# Patient Record
Sex: MALE | Race: AMERICAN INDIAN OR ALASKA NATIVE | NOT HISPANIC OR LATINO | Employment: UNEMPLOYED | URBAN - METROPOLITAN AREA
[De-identification: names, ages, dates, MRNs, and addresses within clinical notes are randomized per-mention and may not be internally consistent; named-entity substitution may affect disease eponyms.]

---

## 2019-09-04 ENCOUNTER — TRANSFERRED RECORDS (OUTPATIENT)
Dept: HEALTH INFORMATION MANAGEMENT | Facility: CLINIC | Age: 39
End: 2019-09-04

## 2019-12-25 ENCOUNTER — TRANSFERRED RECORDS (OUTPATIENT)
Dept: HEALTH INFORMATION MANAGEMENT | Facility: CLINIC | Age: 39
End: 2019-12-25

## 2019-12-26 LAB
CHOLESTEROL (EXTERNAL): 184 MG/DL
HDLC SERPL-MCNC: 44 MG/DL
LDL CHOLESTEROL CALCULATED (EXTERNAL): 107 MG/DL
NON HDL CHOLESTEROL (EXTERNAL): 140 MG/DL
TRIGLYCERIDES (EXTERNAL): 163 MG/DL

## 2021-02-09 ENCOUNTER — TRANSFERRED RECORDS (OUTPATIENT)
Dept: HEALTH INFORMATION MANAGEMENT | Facility: CLINIC | Age: 41
End: 2021-02-09

## 2021-02-17 ENCOUNTER — TRANSFERRED RECORDS (OUTPATIENT)
Dept: HEALTH INFORMATION MANAGEMENT | Facility: CLINIC | Age: 41
End: 2021-02-17

## 2023-07-13 ENCOUNTER — OFFICE VISIT (OUTPATIENT)
Dept: FAMILY MEDICINE | Facility: CLINIC | Age: 43
End: 2023-07-13
Payer: COMMERCIAL

## 2023-07-13 VITALS
OXYGEN SATURATION: 97 % | RESPIRATION RATE: 16 BRPM | TEMPERATURE: 97.8 F | SYSTOLIC BLOOD PRESSURE: 127 MMHG | HEART RATE: 67 BPM | DIASTOLIC BLOOD PRESSURE: 87 MMHG | WEIGHT: 290.9 LBS

## 2023-07-13 DIAGNOSIS — R07.9 CHEST PAIN, UNSPECIFIED TYPE: Primary | ICD-10-CM

## 2023-07-13 DIAGNOSIS — Z85.528 H/O MALIGNANT NEOPLASM OF KIDNEY: ICD-10-CM

## 2023-07-13 DIAGNOSIS — F41.9 ANXIETY: ICD-10-CM

## 2023-07-13 LAB
ANION GAP SERPL CALCULATED.3IONS-SCNC: 8 MMOL/L (ref 7–15)
ATRIAL RATE - MUSE: 60 BPM
BUN SERPL-MCNC: 22.1 MG/DL (ref 6–20)
CALCIUM SERPL-MCNC: 9.5 MG/DL (ref 8.6–10)
CHLORIDE SERPL-SCNC: 102 MMOL/L (ref 98–107)
CREAT SERPL-MCNC: 1.15 MG/DL (ref 0.67–1.17)
DEPRECATED HCO3 PLAS-SCNC: 28 MMOL/L (ref 22–29)
DIASTOLIC BLOOD PRESSURE - MUSE: NORMAL MMHG
GFR SERPL CREATININE-BSD FRML MDRD: 81 ML/MIN/1.73M2
GLUCOSE SERPL-MCNC: 89 MG/DL (ref 70–99)
INTERPRETATION ECG - MUSE: NORMAL
P AXIS - MUSE: 21 DEGREES
POTASSIUM SERPL-SCNC: 5 MMOL/L (ref 3.4–5.3)
PR INTERVAL - MUSE: 154 MS
QRS DURATION - MUSE: 96 MS
QT - MUSE: 406 MS
QTC - MUSE: 406 MS
R AXIS - MUSE: -8 DEGREES
SODIUM SERPL-SCNC: 138 MMOL/L (ref 136–145)
SYSTOLIC BLOOD PRESSURE - MUSE: NORMAL MMHG
T AXIS - MUSE: 33 DEGREES
TROPONIN T SERPL HS-MCNC: 13 NG/L
VENTRICULAR RATE- MUSE: 60 BPM

## 2023-07-13 PROCEDURE — 84484 ASSAY OF TROPONIN QUANT: CPT | Performed by: NURSE PRACTITIONER

## 2023-07-13 PROCEDURE — 93010 ELECTROCARDIOGRAM REPORT: CPT | Performed by: INTERNAL MEDICINE

## 2023-07-13 PROCEDURE — 80048 BASIC METABOLIC PNL TOTAL CA: CPT | Performed by: NURSE PRACTITIONER

## 2023-07-13 PROCEDURE — 93005 ELECTROCARDIOGRAM TRACING: CPT | Performed by: NURSE PRACTITIONER

## 2023-07-13 PROCEDURE — 99204 OFFICE O/P NEW MOD 45 MIN: CPT | Performed by: NURSE PRACTITIONER

## 2023-07-13 PROCEDURE — 36415 COLL VENOUS BLD VENIPUNCTURE: CPT | Performed by: NURSE PRACTITIONER

## 2023-07-13 RX ORDER — HYDROXYZINE HYDROCHLORIDE 25 MG/1
25 TABLET, FILM COATED ORAL 3 TIMES DAILY PRN
Qty: 20 TABLET | Refills: 0 | Status: SHIPPED | OUTPATIENT
Start: 2023-07-13 | End: 2024-01-11

## 2023-07-13 ASSESSMENT — ENCOUNTER SYMPTOMS
FEVER: 0
SHORTNESS OF BREATH: 0

## 2023-07-13 NOTE — PROGRESS NOTES
"Assessment & Plan     Chest pain, unspecified type    - EKG 12-lead, tracing only  - hydrOXYzine (ATARAX) 25 MG tablet  Dispense: 20 tablet; Refill: 0  - Troponin T, High Sensitivity    H/O malignant neoplasm of kidney    - Basic metabolic panel  (Ca, Cl, CO2, Creat, Gluc, K, Na, BUN)    Anxiety    - hydrOXYzine (ATARAX) 25 MG tablet  Dispense: 20 tablet; Refill: 0     Patient with no known history of cardiac disease or lung disease with intermittent, very brief episodes of chest discomfort lasting \"seconds.\"  Can reproduce it with turning side to side somewhat.  Some brief lightheadedness with going from sitting to standing and one episode while sitting for just a few seconds as well.    Reports increased stress and anxiety.  Has not on any medications.  Is in programming at a sober house 8 hours a day related to addiction issues.    Pain is nearly gone with normal EKG.     Does not have a PCP here.    H/O of renal cancer with nephrectomy.  No baseline labs on file.  Discharged with above labs pending.    Discussed most likely not cardiac cause based on description of issue.      Advised the following    If your chest pain is more persistent (several minutes), very severe, or associated with shortness of breath, prolonged dizziness, sweats, please go to the emergency room.    You can try hydroxyzine which is nonaddictive as needed for anxiety.  This also helps with nausea.  This can make you sleepy.     Please try to excuse him if necessary from programming today if he is very tired from this medication.  Otherwise, try if possible to wait until programming is over to take unless symptoms are severe.      Tylenol 1000 mg 3 times daily as needed for back pain.  Avoid ibuprofen due to the kidney cancer history.    I am going to check your kidneys and a blood test for your heart.  If your kidney function is decreased, I will call to have your dosage of your hydroxyzine reduced.      Establish care with a primary care " "provider.  Please be rechecked next week if possible.              Return in about 1 week (around 7/20/2023).    Maty Laguerre CNP  [facility] RAJAT Negrete is a 42 year old male who presents to clinic today for the following health issues:  Chief Complaint   Patient presents with     Chest Pain     X 1 day, yesterday said he felt cramps in his back, pain progressed through the day, chest pains developed, tightness, occasional stabbing pains, is triggered when he moves or turns a certain way, SOB, no cough or fever.      HPI    Patient who is new to our system with h/o obesity with chest discomfort that comes and goes.  A few seconds.  Feels like 1/10 now.  Feeling crampy.  \"Doesn't feel like heartburn.\" Worse with moving a certain way.  Feeling some back pain between shoulder blades with movement as well.       Assoc with feeling faint while walking yesterday after standing up too fast for a couple seconds.  Also having back pain between shoulder blades starting yesterday.      Increased stress.  Patient moved here 2 months ago for treatment for alcoholism.  Last drink 4 to 5 days ago.  Is in a sober housing.  Has had multiple life events as a result of his alcoholism including loss of his 's license and court cases which are stressful.  Feels frustrated that he cannot remedy the situation quickly.    No lung/heart history.       H/o kidney cancer with nephrectomy 2019.  Was in .  Last f/u 2021.          Review of Systems   Constitutional: Negative for fever.   HENT: Negative for congestion.    Respiratory: Negative for shortness of breath.            Objective    /87 (BP Location: Right arm, Patient Position: Sitting, Cuff Size: Adult Large)   Pulse 67   Temp 97.8  F (36.6  C) (Oral)   Resp 16   Wt 132 kg (290 lb 14.4 oz)   SpO2 97%   Physical Exam  Constitutional:       Appearance: Normal appearance.   Cardiovascular:      Rate and Rhythm: Normal " rate and regular rhythm.      Pulses: Normal pulses.      Heart sounds: Normal heart sounds.   Pulmonary:      Effort: Pulmonary effort is normal.      Breath sounds: Normal breath sounds. No wheezing.   Abdominal:      General: Bowel sounds are normal. There is no distension.      Palpations: Abdomen is soft.      Tenderness: There is no abdominal tenderness. There is no guarding.   Musculoskeletal:         General: Tenderness (Upper mid back, near midline.  ) present.   Neurological:      Mental Status: He is alert.   Psychiatric:         Mood and Affect: Mood normal.         Behavior: Behavior normal.         Thought Content: Thought content normal.         Judgment: Judgment normal.          EKG reviewed by Maty Laguerre CNP:   Normal sinus rhythm.  No acute changes.    Results for orders placed or performed in visit on 07/13/23   EKG 12-lead, tracing only     Status: None (Preliminary result)   Result Value Ref Range    Systolic Blood Pressure  mmHg    Diastolic Blood Pressure  mmHg    Ventricular Rate 60 BPM    Atrial Rate 60 BPM    AZ Interval 154 ms    QRS Duration 96 ms     ms    QTc 406 ms    P Axis 21 degrees    R AXIS -8 degrees    T Axis 33 degrees    Interpretation ECG       Sinus rhythm  Normal ECG  No previous ECGs available

## 2023-07-13 NOTE — PATIENT INSTRUCTIONS
Your heart test, EKG is normal.    If your chest pain is more persistent (several minutes), very severe, or associated with shortness of breath, prolonged dizziness, sweats, please go to the emergency room.    You can try hydroxyzine which is nonaddictive as needed for anxiety.  This also helps with nausea.  This can make you sleepy.     Please try to excuse him if necessary from programming today if he is very tired from this medication.  Otherwise, try if possible to wait until programming is over to take unless symptoms are severe.      Tylenol 1000 mg 3 times daily as needed for back pain.  Avoid ibuprofen due to the kidney cancer history.    I am going to check your kidneys and a blood test for your heart.  If your kidney function is decreased, I will call to have your dosage of your hydroxyzine reduced.      Establish care with a primary care provider.  Please be rechecked next week if possible.

## 2023-07-14 ENCOUNTER — TELEPHONE (OUTPATIENT)
Dept: FAMILY MEDICINE | Facility: CLINIC | Age: 43
End: 2023-07-14
Payer: COMMERCIAL

## 2023-07-14 NOTE — TELEPHONE ENCOUNTER
Called and left VM with result per provider and call back tele phone number if any questions.      Shyam Selby MA on 7/14/2023 at 6:39 PM

## 2023-07-14 NOTE — TELEPHONE ENCOUNTER
Call and left message for patient to return call regarding results. Callback number provided.    Sandi Sumner on 7/14/2023 at 12:47 PM

## 2023-07-14 NOTE — TELEPHONE ENCOUNTER
----- Message from Maty Laguerre CNP sent at 7/13/2023 12:21 PM CDT -----  Please call patient and let him know that his blood work rechecked today was not concerning, including heart test.  You can leave a message as well.    Maty Laguerre CNP

## 2023-07-17 NOTE — PROGRESS NOTES
PRIMARY CARE CENTER         HPI:       Severiano Negrete is a 42 year old male with PMH of RCC s/p Right nephrectomy 2019 (done in [place]) and alcohol use disorder who presents to establish care. Patient recently seen in clinic on 7/13 for anxiety and chest pain, EKG was reassuring, he was started on Hydroxyzine 25 mg TID PRN for anxiety.     Patient has not had medical insurance for quite some time and he would like to address multiple health concerns at his visit patient moved from  2 years ago to [place] but recently moved to [place] about 2 months ago.  Unfortunately has not followed up with urology after his nephrectomy in 2019 for the past 2 to 3 years.  He would like to establish care urology for follow-up.  Patient also has a history of hypertension for which she was previously on lisinopril 5 mg daily, however blood pressure seems to be well controlled ever since he quit smoking 2 months ago.    Patient also has a history of substance use disorder (alcohol and marijuana) for which he received multiple treatments last year.  Patient reports last year was a bad year, had 4-5 relapses.  Right now he lives in a sobriety house and he has been sober for the past 60 days    He is also concerned of multiple small red skin lesions that appeared on his chest and upper back, denies any associated itchiness.  He also reports a wart located on the left thumb.  Finally patient is also complaining of varicose veins on lower extremities.    Patient reports he has a history of depression and anxiety for which she was never treated appropriately diagnosed.  He is interested in discussing this further and would like to set up an appointment    Denies fever, chills, no weight loss, n/v.  Patient denies chest pain, shortness of breath, abdominal pain, diarrhea, changes in vision, hematuria, pain with urination.    Problem, Medication and Allergy Lists were reviewed and are current.  Patient is a new patient to this clinic  and so  I reviewed/updated the Past Medical History, the Family History and the Social History.          Review of Systems:     ROS  I have personally reviewed and updated the complete ROS on the day of the visit.           Physical Exam:   There were no vitals taken for this visit.  There is no height or weight on file to calculate BMI.  Vitals were reviewed     Gen: NAD, alert, cooperative, non-toxic  HEENT: EOMI, no conjunctival icterus, tracking appropriately  Resp: CTAB, no crackles or wheezes, no increased WOB  Cardiac: RRR, no S3/S4, no M/R/G appreciated  GI: obese, soft, non-tender to palpation  Ext: WWP, no edema, no erythema  Skin: several cherry hemangiomas noted on anterior chest and upper back. 1cm wart noted on left thumb  Neuro: AOx3, sensation intact b/l  Psych: appropriate affect, slightly depressed mood          Results:     Results from last visit:  Office Visit on 07/13/2023   Component Date Value Ref Range Status     Ventricular Rate 07/13/2023 60  BPM Final     Atrial Rate 07/13/2023 60  BPM Final     [place] Interval 07/13/2023 154  ms Final     QRS Duration 07/13/2023 96  ms Final     QT 07/13/2023 406  ms Final     QTc 07/13/2023 406  ms Final     P Axis 07/13/2023 21  degrees Final     R AXIS 07/13/2023 -8  degrees Final     T Axis 07/13/2023 33  degrees Final     Interpretation ECG 07/13/2023    Final                    Value:Sinus rhythm  Normal ECG  No previous ECGs available  Confirmed by YUMIKO SPRINGER MD LOC:[facility] ([id number]) on 7/13/2023 4:48:04 PM       Sodium 07/13/2023 138  136 - 145 mmol/L Final     Potassium 07/13/2023 5.0  3.4 - 5.3 mmol/L Final     Chloride 07/13/2023 102  98 - 107 mmol/L Final     Carbon Dioxide (CO2) 07/13/2023 28  22 - 29 mmol/L Final     Anion Gap 07/13/2023 8  7 - 15 mmol/L Final     Urea Nitrogen 07/13/2023 22.1 (H)  6.0 - 20.0 mg/dL Final     Creatinine 07/13/2023 1.15  0.67 - 1.17 mg/dL Final     Calcium 07/13/2023 9.5  8.6 - 10.0 mg/dL Final     Glucose 07/13/2023  89  70 - 99 mg/dL Final     GFR Estimate 07/13/2023 81  >60 mL/min/1.73m2 Final     Troponin T, High Sensitivity 07/13/2023 13  <=22 ng/L Final    Either a High Sensitivity Troponin T baseline (0 hours) value = 100 ng/L, or an increase in High Sensitivity Troponin T = 7 ng/L at 2 hours compared to 0 hours (2-0 hours), suggests myocardial injury, and urgent clinical attention is required.    If the 2-0 hours increase is <7 ng/L, a High Sensitivity Troponin T result above gender-specific reference ranges warrants further evaluation.   Recommendations for further evaluation include correlation with clinical decision-making tool (e.g., HEART), a 3rd High Sensitivity Troponin T test 2 hours after the 2nd (a 20% change from baseline would represent concern), admission for observation, close PCC/cardiology follow-up, or urgent outpatient provocative testing.     Assessment and Plan     There are no diagnoses linked to this encounter.     Hx of RCC s/p nephrectomy (2019)  - Urology referral for follow up    MDD  Patient reports history of depression and anxiety which she never officially diagnosed or treated. Pt recently started on Hydroxyzine 25 mg TID PRN on 7/13/23 at urgent care.   -Appointment scheduled next month for further assessment.  Patient is willing to receive treatment      Substance use disorder  History of marijuana and alcohol use, patient lives in sober living. He reports being sober for 60 days, though urgent care note reports last drink on 7/8/2023  -Patient refused any treatment such as naltrexone at this time    Varicose veins  - compression socks ordered    Hx of Essential HTN  Prior on Lisinopril 5 mg, well controlled after quitting smoking 2 months ago.   - Continue to monitor    Left thumb wart  - Will address at next visit. Encourage to use Compound W wart removal over-the-counter    Preventive care  -Hepatitis B, hepatitis C, HIV screening    Next visit: Address MDD and left thumb wart    Options  for treatment and follow-up care were reviewed with the patient. Severiano Negrete engaged in the decision making process and verbalized understanding of the options discussed and agreed with the final plan.    Jair Dietrich DO, PGY2  Internal Medicine  Jul 20, 2023    Pt was seen and plan of care discussed with Gladis Castillo MD.

## 2023-07-20 ENCOUNTER — LAB (OUTPATIENT)
Dept: LAB | Facility: CLINIC | Age: 43
End: 2023-07-20
Payer: COMMERCIAL

## 2023-07-20 ENCOUNTER — OFFICE VISIT (OUTPATIENT)
Dept: INTERNAL MEDICINE | Facility: CLINIC | Age: 43
End: 2023-07-20
Payer: COMMERCIAL

## 2023-07-20 VITALS
WEIGHT: 292 LBS | SYSTOLIC BLOOD PRESSURE: 134 MMHG | DIASTOLIC BLOOD PRESSURE: 88 MMHG | HEIGHT: 69 IN | OXYGEN SATURATION: 98 % | HEART RATE: 69 BPM | BODY MASS INDEX: 43.25 KG/M2

## 2023-07-20 DIAGNOSIS — Z00.00 ENCOUNTER FOR PREVENTIVE CARE: ICD-10-CM

## 2023-07-20 DIAGNOSIS — F10.21 ALCOHOL DEPENDENCE IN REMISSION (H): ICD-10-CM

## 2023-07-20 DIAGNOSIS — I83.93 ASYMPTOMATIC VARICOSE VEINS OF BOTH LOWER EXTREMITIES: ICD-10-CM

## 2023-07-20 DIAGNOSIS — C64.1 RENAL CELL CARCINOMA OF RIGHT KIDNEY (H): Primary | ICD-10-CM

## 2023-07-20 DIAGNOSIS — F41.9 ANXIETY: ICD-10-CM

## 2023-07-20 DIAGNOSIS — C64.1 RENAL CELL CARCINOMA, RIGHT (H): ICD-10-CM

## 2023-07-20 DIAGNOSIS — B07.9 VIRAL WART ON LEFT THUMB: ICD-10-CM

## 2023-07-20 DIAGNOSIS — F33.9 RECURRENT MAJOR DEPRESSIVE DISORDER, REMISSION STATUS UNSPECIFIED (H): ICD-10-CM

## 2023-07-20 DIAGNOSIS — F19.90 SUBSTANCE USE DISORDER: ICD-10-CM

## 2023-07-20 PROBLEM — F32.A DEPRESSION: Status: ACTIVE | Noted: 2023-07-20

## 2023-07-20 LAB
ALBUMIN SERPL BCG-MCNC: 4.4 G/DL (ref 3.5–5.2)
ALP SERPL-CCNC: 87 U/L (ref 40–129)
ALT SERPL W P-5'-P-CCNC: 8 U/L (ref 0–70)
AST SERPL W P-5'-P-CCNC: 15 U/L (ref 0–45)
BILIRUB DIRECT SERPL-MCNC: <0.2 MG/DL (ref 0–0.3)
BILIRUB SERPL-MCNC: 0.3 MG/DL
PROT SERPL-MCNC: 7.5 G/DL (ref 6.4–8.3)

## 2023-07-20 PROCEDURE — 86704 HEP B CORE ANTIBODY TOTAL: CPT

## 2023-07-20 PROCEDURE — 86803 HEPATITIS C AB TEST: CPT

## 2023-07-20 PROCEDURE — 86706 HEP B SURFACE ANTIBODY: CPT

## 2023-07-20 PROCEDURE — 80076 HEPATIC FUNCTION PANEL: CPT | Performed by: PATHOLOGY

## 2023-07-20 PROCEDURE — 99204 OFFICE O/P NEW MOD 45 MIN: CPT | Mod: GC

## 2023-07-20 PROCEDURE — 36415 COLL VENOUS BLD VENIPUNCTURE: CPT | Performed by: PATHOLOGY

## 2023-07-20 PROCEDURE — 87340 HEPATITIS B SURFACE AG IA: CPT

## 2023-07-20 PROCEDURE — 87389 HIV-1 AG W/HIV-1&-2 AB AG IA: CPT

## 2023-07-20 PROCEDURE — 99000 SPECIMEN HANDLING OFFICE-LAB: CPT | Performed by: PATHOLOGY

## 2023-07-20 NOTE — NURSING NOTE
Severiano Negrete is a 42 year old male patient that presents today in clinic for the following:    Chief Complaint   Patient presents with     Establish Care     Nephrology referral, spots on chest, varicose veins      The patient's allergies and medications were reviewed as noted. A set of vitals were recorded as noted without incident. The patient does not have any other questions for the provider.    Robby Gagnon, EMT at 12:59 PM on 7/20/2023

## 2023-07-21 LAB
HBV CORE AB SERPL QL IA: NONREACTIVE
HBV SURFACE AB SERPL IA-ACNC: 0.42 M[IU]/ML
HBV SURFACE AB SERPL IA-ACNC: NONREACTIVE M[IU]/ML
HBV SURFACE AG SERPL QL IA: NONREACTIVE
HCV AB SERPL QL IA: NONREACTIVE
HIV 1+2 AB+HIV1 P24 AG SERPL QL IA: NONREACTIVE

## 2023-08-13 ENCOUNTER — HEALTH MAINTENANCE LETTER (OUTPATIENT)
Age: 43
End: 2023-08-13

## 2023-08-24 ENCOUNTER — TELEPHONE (OUTPATIENT)
Dept: UROLOGY | Facility: CLINIC | Age: 43
End: 2023-08-24

## 2023-08-24 ENCOUNTER — ANCILLARY PROCEDURE (OUTPATIENT)
Dept: GENERAL RADIOLOGY | Facility: CLINIC | Age: 43
End: 2023-08-24
Attending: INTERNAL MEDICINE
Payer: COMMERCIAL

## 2023-08-24 ENCOUNTER — OFFICE VISIT (OUTPATIENT)
Dept: INTERNAL MEDICINE | Facility: CLINIC | Age: 43
End: 2023-08-24
Payer: COMMERCIAL

## 2023-08-24 ENCOUNTER — TELEPHONE (OUTPATIENT)
Dept: CARE COORDINATION | Facility: CLINIC | Age: 43
End: 2023-08-24

## 2023-08-24 ENCOUNTER — TELEPHONE (OUTPATIENT)
Dept: INTERNAL MEDICINE | Facility: CLINIC | Age: 43
End: 2023-08-24

## 2023-08-24 VITALS
SYSTOLIC BLOOD PRESSURE: 125 MMHG | OXYGEN SATURATION: 95 % | DIASTOLIC BLOOD PRESSURE: 81 MMHG | HEIGHT: 69 IN | HEART RATE: 80 BPM | WEIGHT: 302 LBS | BODY MASS INDEX: 44.73 KG/M2

## 2023-08-24 DIAGNOSIS — R63.4 WEIGHT LOSS: Primary | ICD-10-CM

## 2023-08-24 DIAGNOSIS — S99.921D INJURY OF RIGHT FOOT, SUBSEQUENT ENCOUNTER: ICD-10-CM

## 2023-08-24 DIAGNOSIS — F41.1 GAD (GENERALIZED ANXIETY DISORDER): Primary | ICD-10-CM

## 2023-08-24 PROCEDURE — 73630 X-RAY EXAM OF FOOT: CPT | Mod: RT | Performed by: RADIOLOGY

## 2023-08-24 PROCEDURE — 99214 OFFICE O/P EST MOD 30 MIN: CPT | Mod: GC

## 2023-08-24 ASSESSMENT — PATIENT HEALTH QUESTIONNAIRE - PHQ9
5. POOR APPETITE OR OVEREATING: MORE THAN HALF THE DAYS
SUM OF ALL RESPONSES TO PHQ QUESTIONS 1-9: 8

## 2023-08-24 ASSESSMENT — PAIN SCALES - GENERAL: PAINLEVEL: MILD PAIN (3)

## 2023-08-24 ASSESSMENT — ANXIETY QUESTIONNAIRES
GAD7 TOTAL SCORE: 16
6. BECOMING EASILY ANNOYED OR IRRITABLE: NEARLY EVERY DAY
1. FEELING NERVOUS, ANXIOUS, OR ON EDGE: MORE THAN HALF THE DAYS
5. BEING SO RESTLESS THAT IT IS HARD TO SIT STILL: SEVERAL DAYS
GAD7 TOTAL SCORE: 16
IF YOU CHECKED OFF ANY PROBLEMS ON THIS QUESTIONNAIRE, HOW DIFFICULT HAVE THESE PROBLEMS MADE IT FOR YOU TO DO YOUR WORK, TAKE CARE OF THINGS AT HOME, OR GET ALONG WITH OTHER PEOPLE: VERY DIFFICULT
7. FEELING AFRAID AS IF SOMETHING AWFUL MIGHT HAPPEN: MORE THAN HALF THE DAYS
2. NOT BEING ABLE TO STOP OR CONTROL WORRYING: NEARLY EVERY DAY
3. WORRYING TOO MUCH ABOUT DIFFERENT THINGS: NEARLY EVERY DAY

## 2023-08-24 NOTE — TELEPHONE ENCOUNTER
ISABELM and callback to schedule new pt in uro and per referral with either Dr. Severiano Marcelino or Dr. Samson

## 2023-08-24 NOTE — TELEPHONE ENCOUNTER
[place]- Inpatient Dietitian at the [facility] called and stated they keep getting referrals from some of the resident Mds to see a dietitian inpatient and they do not see those patients- this pt is an example.

## 2023-08-24 NOTE — TELEPHONE ENCOUNTER
In 3 weeks please follow up with patient on-    Imaging  Prozac  Mental [facility] Referral     He also needs to schedule with urology, hx of renal cell carcinoma status post nephrectomy     Rogelio Strauss RN on 8/24/2023 at 1:30 PM

## 2023-08-24 NOTE — PROGRESS NOTES
"                     PRIMARY CARE CENTER       SUBJECTIVE:  Severiano Negrete is a 43 year old male with a PMHx of  RCC s/p Right nephrectomy 2019 (done in [place]) and alcohol use disorder  who comes in for follow up visit. Pt is worried about gaining weight, has gained 12 pounds since last visit in July (290 to 302 lbs).  He thinks this is mostly due to lack of exercise and \" sitting around all day during alcohol addiction therapy groups meetings\".  Patient purchased a new bike and is planning on increasing his exercise.  We had a long conversation regarding diet and exercise, should avoid carbohydrates and fried food, fast food is much as possible.  I advised him to use phone apps such as my fitness pal to track his calorie intake.  He is also willing to discuss further with a nutritionist.    In addition, pt sprained his right ankle 3 weeks ago when stepping out of the bus. He has a hx of right foot fracture many years ago (?20 years), had screws placed at that time, he was told these should have been removed.  Would like to get a foot x-ray to assess screws and follow-up with orthopedic.    Patient states his anxiety has been affecting his day-to-day life and social interactions.  He also has a history of depression however he seems to be more concerned about his anxiety.  He is not sure if he ever used medications in the past for his depression anxiety, though fluoxetine sounds familiar to him.  He is not aware of any side effects to fluoxetine in the past if ever taken.    Urology consult was placed at last visit to follow-up on history of renal cell carcinoma status post nephrectomy from 2019, patient did not schedule appointment yet.  Contact information was supplied to patient and strongly encouraged to schedule follow-up appointment      Medications and allergies reviewed by me today.     ROS:   Constitutional, neuro, ENT, endocrine, pulmonary, cardiac, gastrointestinal, genitourinary, musculoskeletal, integument " "and psychiatric systems are negative, except as otherwise noted.    OBJECTIVE:    /81 (BP Location: Right arm, Patient Position: Sitting, Cuff Size: Adult Large)   Pulse 80   Ht 1.762 m (5' 9.37\")   Wt 137 kg (302 lb)   SpO2 95%   BMI 44.12 kg/m     Wt Readings from Last 1 Encounters:   08/24/23 137 kg (302 lb)     Gen: NAD, alert, cooperative, non-toxic  HEENT: EOMI, no conjunctival icterus, tracking appropriately  Resp: CTAB, no crackles or wheezes, no increased WOB  Cardiac: RRR, no S3/S4, no M/R/G appreciated  GI: obese, soft, non-tender to palpation  Ext: WWP, no edema, no erythema  Skin: several cherry hemangiomas noted on anterior chest and upper back. 1cm in diameter wart noted on left thumb  Neuro: AOx3, sensation intact b/l  Psych: appropriate affect, slightly depressed mood     ASSESSMENT/PLAN:    #Depression  #Anxiety  Pt with known hx of Depression, anxiety unsure if he was ever on medication prior. PHQ9 score 8, GAD7 score 16. Anxiety has been impacting his daily life and social interactions.   - Fluoxetine 20 mg daily, RN will follow up in 3 weeks to assess for side effects, follow up in clinic in 2 months.   - Mental health referral placed    #Left thumb wart  Protuberant wart measuring approx 1x1 cm in size located on lateral aspect of right thumb base. Pt used Compound W with no improvement. Treated with 2 rounds of cryotherapy in office today, no immediate complications noted. Plan to reassess at next visit.   - CTM    #Hx of right foot fracture  #Right foot sprain  Hx of right foot fracture that required placement of several screws, was recommended to have these removed many years ago. Pt also reports recent right foot sprain 3 weeks ago when stepping out of bus, seems to have resolved. Pt able to bear weight, no acute pain, motion/sensation intact  - 3 view r foot xray ordered  - Ortho referral placed    #Obesity  Pt complains of 10 lbs weight gain since last visit 1 month ago. We " discussed exercise regimen and diet, pt seems motivated to lose weight.  - Nutrition service consult placed    #Hx of RCC s/p nephrectomy 2019  Urology referral placed at last visit, phone number provided to patient again. Strongly encouraged patient to schedule appointment as soon as possible for appropriate follow up.     #Hx of HTN  Well controlled off medication, prior on Lisinopril  - CTM    Next visit: Reassess warts, pt would like to discuss scrotal cyst, evaluate depression/anxiety       Pt should return to clinic for f/u with me in 2 months     Jair Dietrich DO, PGY2  Internal Medicine  [facility]   Clinic phone [phone]  Aug 24, 2023    Pt was seen and plan of care discussed with Dr. Ramos.

## 2023-08-24 NOTE — NURSING NOTE
Severiano Negrete is a 43 year old male that presents in clinic today for the following:     Chief Complaint   Patient presents with    Follow Up     Pt here for one month follow up        The patient's allergies and medications were reviewed. The patient's vitals were obtained without incident. The patient does not have any other questions for the provider.     Milady Leonardo, MYLENE at 12:07 PM on 8/24/2023.  Primary Care Clinic: [phone]

## 2023-08-24 NOTE — PATIENT INSTRUCTIONS
Debrox OTC for ear wax.     Our nurse will reach out in 3 weeks to see how the new medication is working for you. Please reach out to us if experiencing suicidal thoughts, worsening depression and anxiety.     Urology  Please call [phone] to schedule an appointment with Urology.     Xray  To schedule your Xray appointment with imaging, please call [phone].

## 2023-09-05 NOTE — TELEPHONE ENCOUNTER
Action 2023 Jtv 11:44 AM    Action Taken CSS called patient. Patient did not . [facility] LVM for patient to return call. Message was sent to Nurse with update.      Action 2023 JTV 10:34 AM    Action Taken CSS sent an urgent request to Erik Frye for images. Fax: [phone]    Trackin     Action 2023 jtv 9:33 AM    Action Taken iMAGES HAVE BEEN RECEIVED.        MEDICAL RECORDS REQUEST   [facility]  Urology Clinic  [address] [place] [address]  PHONE: [phone]  Fax: [phone]        FUTURE VISIT INFORMATION                                                   Severiano Negrete, : 1980 scheduled for future visit at [facility]    APPOINTMENT INFORMATION:  Date: 2023  Provider:  Darrin Chaparro MD  Reason for Visit/Diagnosis: Renal cell carcinoma of right kidney     REFERRAL INFORMATION:  Referring provider:  Jair Dietrich MD in [facility]    RECORDS REQUESTED FOR VISIT                                                     NOTES  STATUS/DETAILS   OFFICE NOTE from referring provider  yes, 2023 -- Jair Dietrich MD in [facility]   OFFICE NOTE from other specialist  yes, 2023 -- Vlad Porter MD @ [facility]   MEDICATION LIST  yes   KIDNEY MASS     CHEST XRAY (CXR)  YES IMAGES PENDING   PATHOLOGY REPORT & SLIDES  no     PRE-VISIT CHECKLIST      Joint diagnostic appointment coordinated correctly          (ensure right order & amount of time) Yes   RECORD COLLECTION COMPLETE YES

## 2023-09-08 NOTE — TELEPHONE ENCOUNTER
Action 09/08/23  3:31 PM - [facility]   Action Taken  Pt had surgery 20+ years ago in [place], [place]    Called the 3 possible hospitals:  - [facility] does not have record of him.  - Left message for [facility]  - [facility] does not have record of him.       DIAGNOSIS: (R) Old Injury of right foot, subsequent encounter, images, Paramjit Ramos    APPOINTMENT DATE: 09/18/23   NOTES STATUS DETAILS   OFFICE NOTE from referring provider Internal 08/24/23 - Jair Dietrich MD    OPERATIVE REPORT N/A Roughly 20+ years ago in [place], [place].   MEDICATION LIST Internal    XRAYS  & INJECTIONS (IMAGES & REPORTS) Internal XR R FOOT:  - 08/24/23

## 2023-09-18 ENCOUNTER — PRE VISIT (OUTPATIENT)
Dept: ORTHOPEDICS | Facility: CLINIC | Age: 43
End: 2023-09-18

## 2023-09-18 ENCOUNTER — OFFICE VISIT (OUTPATIENT)
Dept: ORTHOPEDICS | Facility: CLINIC | Age: 43
End: 2023-09-18
Attending: INTERNAL MEDICINE
Payer: COMMERCIAL

## 2023-09-18 DIAGNOSIS — S99.921D INJURY OF RIGHT FOOT, SUBSEQUENT ENCOUNTER: ICD-10-CM

## 2023-09-18 PROCEDURE — 99204 OFFICE O/P NEW MOD 45 MIN: CPT | Performed by: FAMILY MEDICINE

## 2023-09-18 NOTE — PROGRESS NOTES
[facility]  SPORTS & ORTHOPEDIC CLINIC VISIT     Sep 18, 2023        ASSESSMENT & PLAN    43-year-old with right foot and ankle pain after recent injury in the setting of previous hardware fixation and failure.  At the current time his symptoms seem consistent with ligamentous injury.  I do not suspect the current time his hardware is causing him any degree of symptoms.    Reviewed imaging and assessment with patient in detail  And provided with home exercises and we discussed activity modifications for his ankle sprain.  We also discussed the timing and indication for removal of hardware.  At the current time is not felt that the hardware is a significant tributary to his symptoms and therefore would not advise removal.  However if the experiences discomfort in the future we would refer to a surgeon for further discussion.    Aden Lewis MD  [facility] [place]    -----  Chief Complaint   Patient presents with    Right Foot - Pain       SUBJECTIVE  Severiano Negrete is a/an 43 year old male who is seen in consultation at the request of  Dave Ramos M.D. for evaluation of  right foot injury.     The patient is seen by themselves.  The patient is Right handed    Onset: 1 month(s) ago. Patient describes injury as rolling ankle getting off a bus.   Location of Pain: right foot  Worsened by: Only pain was when he sprained his ankle   Better with: NA   Treatments tried: rest/activity avoidance  Associated symptoms: no distal numbness or tingling; denies swelling or warmth    Orthopedic/Surgical history: YES - Date: 20 years ago? Has 3 screws in   Social History/Occupation: No       REVIEW OF SYSTEMS:  Do you have fever, chills, weight loss? No  Do you have any vision problems? No  Do you have any chest pain or edema? No  Do you have any shortness of breath or wheezing?  No  Do you have stomach problems? No  Do you have any numbness or focal weakness? No  Do you  have diabetes? No   Do you have problems with bleeding or clotting? No  Do you have an rashes or other skin lesions? No    OBJECTIVE:  There were no vitals taken for this visit.     Right foot: Warm and well-perfused.  Sensation intact to light touch.  Symmetric range of motion of the ankle.  Strength intact.  Palpable hardware over the proximal fifth metatarsal though nontender.  No tenderness throughout the midfoot.  TTP over the ATFL and CFL.  No TTP over the tibiotalar joint or medial ankle.    RADIOLOGY:    Three-view x-rays of the right foot performed 8/24/2023 are reviewed independently which demonstrate screws in the first second and fifth metatarsal tarsal junctions.  Fracture of the screw in the fifth metatarsal but otherwise no signs of loosening.  No acute findings.  No significant DJD.  See EMR for full radiology report.

## 2023-09-18 NOTE — PATIENT INSTRUCTIONS
WHAT IS AN ANKLE SPRAIN:  Symptoms include pain, bruising, and swelling around ankle, often on outer side. The pain may be sharp with activity and dull at rest. You may be walking with a limp at first. The swelling is often present after the pain resolves. If your pain is severe, not improving over 5-7 days, or shoots up your leg, let your physician know as you may need crutches and an immobilizer.    Treatment:  -Ice 10-15 minutes several times a day for the first few days and then after activity.  -Walk as tolerated. Restrict other activities until pain allows. Biking, pool running or swimming are good alternative activities.  -You may benefit from an ankle brace for support while strengthening with therapy  -Some require immobilzation and crutches initially    Strengthening therapy  -Ice 10-15 minutes after activity. (or Ice bath 5-7min)  -often hip and ankle weakness leads to lower extremity (foot, ankle, shin, and knee) problems so a lot of focus will be on core strength and balance  - recommend yoga for core strengthening and stretching  -Perform exercises as instructed through handout or formal therapy if doing. Until then start with the following:  -ankle strengthening (additional below)   1) balance on one foot 1-2 min daily (perform on both feet)   2) arch raises- tighten bottom of foot like trying to shorten foot and hold x 3-5  sec, repeat 5 times   3) ankle exercises (4 way with theraband)- 3 sets of 10-15 (fatigue) daily   5) heel raises on step-- once painfree lowering on both, start to slowly lower on  one foot    Return to activity guidelines:  -If it hurts, do not do it!  -wear ankle brace until pain free with full activity and exercises for at least 6 weeks  -Must meet each goal before return to play:   1) painfree jogging straight line   2) pain free sprints   3) pain free cutting/ changing directions      Ankle Sprain Exercises    As soon as it doesn t hurt too much to put pressure on the ball  of your foot, start stretching your ankle using the towel stretch. When this stretch is easy, try the other exercises.    Towel stretch: Sit on a hard surface with your injured leg stretched out in front of you. Loop a towel around your toes and the ball of your foot and pull the towel toward your body keeping your leg straight. Hold this position for 15 to 30 seconds and then relax. Repeat 3 times.    Standing calf stretch: Stand facing a wall with your hands on the wall at about eye level. Keep your injured leg back with your heel on the floor. Keep the other leg forward with the knee bent. Turn your back foot slightly inward (as if you were pigeon-toed). Slowly lean into the wall until you feel a stretch in the back of your calf. Hold the stretch for 15 to 30 seconds. Return to the starting position. Repeat 3 times. Do this exercise several times each day.    Standing soleus stretch: Stand facing a wall with your hands on the wall at about chest height. Keep your injured leg back with your heel on the floor. Keep the other leg forward with the knee bent. Turn your back foot slightly inward (as if you were pigeon-toed). Bend your back knee slightly and gently lean into the wall until you feel a stretch in the lower calf of your injured leg. Hold the stretch for 15 to 30 seconds. Return to the starting position. Repeat 3 times.    Ankle range of motion: Sit or lie down with your legs straight and your knees pointing toward the ceiling. Point your toes on your injured side toward your nose, then away from your body. Point your toes in toward your other foot and then out away from your other foot. Finally, move the top of your foot in circles. Move only your foot and ankle. Don't move your leg. Repeat 10 times in each direction. Push hard in all directions.  Resisted ankle dorsiflexion: Tie a knot in one end of the elastic tubing and shut the knot in a door. Tie a loop in the other end of the tubing and put the foot  on your injured side through the loop so that the tubing goes around the top of the foot. Sit facing the door with your injured leg straight out in front of you. Move away from the door until there is tension in the tubing. Keeping your leg straight, pull the top of your foot toward your body, stretching the tubing. Slowly return to the starting position. Do 2 sets of 15.  Resisted ankle plantar flexion: Sit with your injured leg stretched out in front of you. Loop the tubing around the ball of your foot. Hold the ends of the tubing with both hands. Gently press the ball of your foot down and point your toes, stretching the tubing. Return to the starting position. Do 2 sets of 15.    Resisted ankle inversion: Sit with your legs stretched out in front of you. Cross the ankle of your uninjured leg over your other ankle. Wrap elastic tubing around the ball of the foot of your injured leg and then loop it around your other foot so that the tubing is anchored there at one end. Hold the other end of the tubing in your hand. Turn the foot of your injured leg inward and upward. This will stretch the tubing. Return to the starting position. Do 2 sets of 15.    Resisted ankle eversion: Sit with both legs stretched out in front of you, with your feet about a shoulder's width apart. Tie a loop in one end of elastic tubing. Put the foot of your injured leg through the loop so that the tubing goes around the arch of that foot and wraps around the outside of the other foot. Hold onto the other end of the tubing with your hand to provide tension. Turn the foot of your injured leg up and out. Make sure you keep your other foot still so that it will allow the tubing to stretch as you move the foot of your injured leg. Return to the starting position. Do 2 sets of 15.  You may do the following exercises when you can stand on your injured ankle without pain.    Heel raise: Stand behind a chair or counter with both feet flat on the  floor. Using the chair or counter as a support, rise up onto your toes and hold for 5 seconds. Then slowly lower yourself down without holding onto the support. (It's OK to keep holding onto the support if you need to.) When this exercise becomes less painful, try doing this exercise while you are standing on the injured leg only. Repeat 15 times. Do 2 sets of 15. Rest 30 seconds between sets.    Step-up: Stand with the foot of your injured leg on a support 3 to 5 inches (8 to 13 centimeters) high --like a small step or block of wood. Keep your other foot flat on the floor. Shift your weight onto the injured leg on the support. Straighten your injured leg as the other leg comes off the floor. Return to the starting position by bending your injured leg and slowly lowering your uninjured leg back to the floor. Do 2 sets of 15.    Balance and reach exercises: Stand next to a chair with your injured leg farther from the chair. The chair will provide support if you need it. Stand on the foot of your injured leg and bend your knee slightly. Try to raise the arch of this foot while keeping your big toe on the floor. Keep your foot in this position.    With the hand that is farther away from the chair, reach forward in front of you by bending at the waist. Avoid bending your knee any more as you do this. Repeat this 15 times. To make the exercise more challenging, reach farther in front of you. Do 2 sets of 15.    While keeping your arch raised, reach the hand that is farther away from the chair across your body toward the chair. The farther you reach, the more challenging the exercise. Do 2 sets of 15.    Side-lying leg lift: Lie on your uninjured side. Tighten the front thigh muscles on your injured leg and lift that leg 8 to 10 inches (20 to 25 centimeters) away from the other leg. Keep the leg straight and lower it slowly. Do 2 sets of 15.  If you have access to a wobble board, do the following exercises:    Wobble  board exercises  Stand on a wobble board with your feet shoulder-width apart.    Rock the board forwards and backwards 30 times, then side to side 30 times. Hold on to a chair if you need support.  Rotate the wobble board around so that the edge of the board is in contact with the floor at all times. Do this 30 times in a clockwise and then a counterclockwise direction.  Balance on the wobble board for as long as you can without letting the edges touch the floor. Try to do this for 2 minutes without touching the floor.  Rotate the wobble board in clockwise and counterclockwise circles, but do not let the edge of the board touch the floor.  When you have mastered the wobble exercises standing on both legs, try repeating them while standing on just your injured leg. After you are able to do these exercises on one leg, try to do them with your eyes closed. Make sure you have something nearby to support you in case you lose your balance.    Developed by [employer].  Published by [employer].  Copyright  2014 [employer] and/or one of its subsidiaries. All rights reserved.

## 2023-09-18 NOTE — LETTER
9/18/2023      RE: Severiano Negrete  [address]  [place] [address]     Dear Colleague,    Thank you for referring your patient, Severiano Negrete, to the [facility]. Please see a copy of my visit note below.      [facility]  SPORTS & ORTHOPEDIC CLINIC VISIT     Sep 18, 2023        ASSESSMENT & PLAN    43-year-old with right foot and ankle pain after recent injury in the setting of previous hardware fixation and failure.  At the current time his symptoms seem consistent with ligamentous injury.  I do not suspect the current time his hardware is causing him any degree of symptoms.    Reviewed imaging and assessment with patient in detail  And provided with home exercises and we discussed activity modifications for his ankle sprain.  We also discussed the timing and indication for removal of hardware.  At the current time is not felt that the hardware is a significant tributary to his symptoms and therefore would not advise removal.  However if the experiences discomfort in the future we would refer to a surgeon for further discussion.    Aden Lewis MD  [facility]    -----  Chief Complaint   Patient presents with     Right Foot - Pain       SUBJECTIVE  Severiano Negrete is a/an 43 year old male who is seen in consultation at the request of  Dave Ramos M.D. for evaluation of  right foot injury.     The patient is seen by themselves.  The patient is Right handed    Onset: 1 month(s) ago. Patient describes injury as rolling ankle getting off a bus.   Location of Pain: right foot  Worsened by: Only pain was when he sprained his ankle   Better with: NA   Treatments tried: rest/activity avoidance  Associated symptoms: no distal numbness or tingling; denies swelling or warmth    Orthopedic/Surgical history: YES - Date: 20 years ago? Has 3 screws in   Social History/Occupation: No       REVIEW OF SYSTEMS:  Do you  have fever, chills, weight loss? No  Do you have any vision problems? No  Do you have any chest pain or edema? No  Do you have any shortness of breath or wheezing?  No  Do you have stomach problems? No  Do you have any numbness or focal weakness? No  Do you have diabetes? No   Do you have problems with bleeding or clotting? No  Do you have an rashes or other skin lesions? No    OBJECTIVE:  There were no vitals taken for this visit.     Right foot: Warm and well-perfused.  Sensation intact to light touch.  Symmetric range of motion of the ankle.  Strength intact.  Palpable hardware over the proximal fifth metatarsal though nontender.  No tenderness throughout the midfoot.  TTP over the ATFL and CFL.  No TTP over the tibiotalar joint or medial ankle.    RADIOLOGY:    Three-view x-rays of the right foot performed 8/24/2023 are reviewed independently which demonstrate screws in the first second and fifth metatarsal tarsal junctions.  Fracture of the screw in the fifth metatarsal but otherwise no signs of loosening.  No acute findings.  No significant DJD.  See EMR for full radiology report.             Again, thank you for allowing me to participate in the care of your patient.      Sincerely,    Aden Lewis MD

## 2023-09-19 ENCOUNTER — PRE VISIT (OUTPATIENT)
Dept: ONCOLOGY | Facility: CLINIC | Age: 43
End: 2023-09-19
Payer: COMMERCIAL

## 2023-09-19 ENCOUNTER — PATIENT OUTREACH (OUTPATIENT)
Dept: ONCOLOGY | Facility: CLINIC | Age: 43
End: 2023-09-19

## 2023-09-19 ENCOUNTER — OFFICE VISIT (OUTPATIENT)
Dept: ONCOLOGY | Facility: CLINIC | Age: 43
End: 2023-09-19
Attending: INTERNAL MEDICINE
Payer: COMMERCIAL

## 2023-09-19 VITALS
HEIGHT: 70 IN | HEART RATE: 61 BPM | SYSTOLIC BLOOD PRESSURE: 137 MMHG | BODY MASS INDEX: 43.84 KG/M2 | OXYGEN SATURATION: 98 % | RESPIRATION RATE: 18 BRPM | WEIGHT: 306.2 LBS | DIASTOLIC BLOOD PRESSURE: 84 MMHG

## 2023-09-19 DIAGNOSIS — C64.1 RENAL CELL CARCINOMA OF RIGHT KIDNEY (H): Primary | ICD-10-CM

## 2023-09-19 PROCEDURE — G0463 HOSPITAL OUTPT CLINIC VISIT: HCPCS | Performed by: STUDENT IN AN ORGANIZED HEALTH CARE EDUCATION/TRAINING PROGRAM

## 2023-09-19 PROCEDURE — 99203 OFFICE O/P NEW LOW 30 MIN: CPT | Performed by: STUDENT IN AN ORGANIZED HEALTH CARE EDUCATION/TRAINING PROGRAM

## 2023-09-19 ASSESSMENT — PAIN SCALES - GENERAL: PAINLEVEL: NO PAIN (0)

## 2023-09-19 NOTE — PATIENT INSTRUCTIONS
Follow-up yearly   CT abdomen and Pelvis to be done and Chest Xray.  We will update results on my chart  Please collect old records

## 2023-09-19 NOTE — PROGRESS NOTES
Chief Complaint:   History of right radical nephrectomy for kidney cancer in 2019           Consult or Referral:     Mr. Severiano Negrete is a 43 year old male seen at the request of  No ref. provider found.         History of Present Illness:     Severiano Nergete is a 43 year old male being seen for establishing care for RCC.  Duration of problem: 4 years  Previous treatments: Right radical nephrectomy in 2019      Reviewed previous notes from Dr. Kaur العلي presents today here for evaluation and establishing care  He has a prior history of right radical nephrectomy with robotic approach in 2019 in [place]  We do not have any details of the pathology although his records from surgery  He has not had any surveillance imaging done since  He is here to establish follow-up with us           Past Medical History:   No past medical history on file.         Past Surgical History:   No past surgical history on file.         Medications     Current Outpatient Medications   Medication    FLUoxetine (PROZAC) 20 MG capsule    hydrOXYzine (ATARAX) 25 MG tablet     No current facility-administered medications for this visit.            Family History:   No family history on file.         Social History:     Social History     Socioeconomic History    Marital status: Single     Spouse name: Not on file    Number of children: Not on file    Years of education: Not on file    Highest education level: Not on file   Occupational History    Not on file   Tobacco Use    Smoking status: Former     Packs/day: 1.00     Types: Cigarettes     Start date: 1993     Quit date: 2023     Years since quittin.3    Smokeless tobacco: Never   Substance and Sexual Activity    Alcohol use: Not Currently    Drug use: Not Currently    Sexual activity: Not on file   Other Topics Concern    Not on file   Social History Narrative    Not on file     Social Determinants of Health     Financial Resource Strain: Not on file   Food Insecurity: Not  "on file   Transportation Needs: Not on file   Physical Activity: Not on file   Stress: Not on file   Social Connections: Not on file   Intimate Partner Violence: Not on file   Housing Stability: Not on file            Allergies:   Patient has no known allergies.         Review of Systems:  From intake questionnaire     Skin: negative  Eyes: negative  Ears/Nose/Throat: negative  Respiratory: No shortness of breath, dyspnea on exertion, cough, or hemoptysis  Cardiovascular: No chest pain or palpitations  Gastrointestinal: negative; no nausea/vomiting, constipation or diarrhea  Genitourinary: as per HPI  Musculoskeletal: negative  Neurologic: negative  Psychiatric: negative  Hematologic/Lymphatic/Immunologic: negative  Endocrine: negative         Physical Exam:     Patient is a 43 year old  male   Vitals: Blood pressure 137/84, pulse 61, resp. rate 18, height 1.778 m (5' 10\"), weight 138.9 kg (306 lb 3.2 oz), SpO2 98 %.  Constitutional: Body mass index is 43.94 kg/m .  Alert, no acute distress, oriented, conversant  Eyes: no scleral icterus; extraocular muscles intact, moist conjunctivae  Neck: trachea midline, no thyromegaly  Ears/nose/mouth: throat/mouth:normal, good dentition  Respiratory: no respiratory distress, or pursed lip breathing  Cardiovascular: pulses strong and intact; no obvious jugular venous distension present  Gastrointestinal: soft, nontender, no organomegaly or masses,   Lymphatics: No inguinal adenopathy  Musculoskeletal: extremities normal, no peripheral edema  Skin: no suspicious lesions or rashes  Neuro: Alert, oriented, speech and mentation normal  Psych: affect and mood normal, alert and oriented to person, place and time  Gait: Normal  : deferred      Labs and Pathology:    The following labs were reviewed by me and discussed with the patient:    Significant for   Lab Results   Component Value Date    CR 1.15 07/13/2023     No results found for: PSA          Imaging:    The following " imaging exams were independently viewed and interpreted by me and discussed with patient:               Assessment and Plan:     Renal cell carcinoma of right kidney (H)  Here to establish care for prior history of RCC proximately 4 years ago  Recommend baseline imaging and have ordered CT abdomen and x-ray chest  We will collect his records from [facility] done as none are available prior to 2021  Plan to continue yearly follow-up till 5 years and further follow-up will be based on the primary pathology and stage of disease at the time of surgery  He agrees  - Adult Urology  Referral  - CT Abdomen pelvis w & w/o contrast; Future  - XR Chest 2 Views; Future      Plan:  CT abdomen pelvis with and without contrast and x-ray chest    Orders  Orders Placed This Encounter   Procedures    CT Abdomen pelvis w & w/o contrast    XR Chest 2 Views       Darrin Chaparro MD  [facility]      ==========================    Additional Billing and Coding Information:  Review of external notes as documented above   Review of the result(s) of each unique test - creatinine                20 minutes spent by me on the date of the encounter doing chart review, review of test results, interpretation of tests, patient visit, and documentation     ==========================

## 2023-09-19 NOTE — PROGRESS NOTES
"Urology Rooming Note    September 19, 2023 10:07 AM   Severiano Negrete is a 43 year old male who presents for:    Chief Complaint   Patient presents with    Oncology Clinic Visit     Renal cell carcinoma, right (H)       Initial Vitals: /84   Pulse 61   Resp 18   Ht 1.778 m (5' 10\")   Wt 138.9 kg (306 lb 3.2 oz)   SpO2 98%   BMI 43.94 kg/m   Estimated body mass index is 43.94 kg/m  as calculated from the following:    Height as of this encounter: 1.778 m (5' 10\").    Weight as of this encounter: 138.9 kg (306 lb 3.2 oz). Body surface area is 2.62 meters squared.  No Pain (0) Comment: Data Unavailable     Allergies reviewed: Yes  Medications reviewed: Yes    Medications: Medication refills not needed today.  Pharmacy name entered into [facility]: [id number] - [place], [place] - [address] AT SEC OF [address]   "

## 2023-09-26 ENCOUNTER — HOSPITAL ENCOUNTER (OUTPATIENT)
Dept: CT IMAGING | Facility: CLINIC | Age: 43
Discharge: HOME | End: 2023-09-26
Attending: STUDENT IN AN ORGANIZED HEALTH CARE EDUCATION/TRAINING PROGRAM
Payer: COMMERCIAL

## 2023-09-26 ENCOUNTER — HOSPITAL ENCOUNTER (OUTPATIENT)
Dept: RADIOLOGY | Facility: CLINIC | Age: 43
Discharge: HOME | End: 2023-09-26
Attending: STUDENT IN AN ORGANIZED HEALTH CARE EDUCATION/TRAINING PROGRAM
Payer: COMMERCIAL

## 2023-09-26 DIAGNOSIS — C64.1 RENAL CELL CARCINOMA OF RIGHT KIDNEY (H): ICD-10-CM

## 2023-09-26 PROCEDURE — 250N000011 HC RX IP 250 OP 636: Mod: JZ | Performed by: STUDENT IN AN ORGANIZED HEALTH CARE EDUCATION/TRAINING PROGRAM

## 2023-09-26 PROCEDURE — 71046 X-RAY EXAM CHEST 2 VIEWS: CPT

## 2023-09-26 PROCEDURE — 74178 CT ABD&PLV WO CNTR FLWD CNTR: CPT

## 2023-09-26 RX ORDER — IOPAMIDOL 755 MG/ML
100 INJECTION, SOLUTION INTRAVASCULAR ONCE
Status: COMPLETED | OUTPATIENT
Start: 2023-09-26 | End: 2023-09-26

## 2023-09-26 RX ADMIN — IOPAMIDOL 100 ML: 755 INJECTION, SOLUTION INTRAVENOUS at 16:55

## 2023-12-21 ENCOUNTER — OFFICE VISIT (OUTPATIENT)
Dept: INTERNAL MEDICINE | Facility: CLINIC | Age: 43
End: 2023-12-21
Payer: COMMERCIAL

## 2023-12-21 ENCOUNTER — LAB (OUTPATIENT)
Dept: LAB | Facility: CLINIC | Age: 43
End: 2023-12-21
Payer: COMMERCIAL

## 2023-12-21 VITALS
WEIGHT: 315 LBS | HEART RATE: 77 BPM | HEIGHT: 70 IN | SYSTOLIC BLOOD PRESSURE: 132 MMHG | BODY MASS INDEX: 45.1 KG/M2 | DIASTOLIC BLOOD PRESSURE: 88 MMHG | OXYGEN SATURATION: 96 %

## 2023-12-21 DIAGNOSIS — Z13.9 ENCOUNTER FOR SCREENING INVOLVING SOCIAL DETERMINANTS OF HEALTH (SDOH): Primary | ICD-10-CM

## 2023-12-21 DIAGNOSIS — F41.1 GAD (GENERALIZED ANXIETY DISORDER): ICD-10-CM

## 2023-12-21 DIAGNOSIS — Z13.9 ENCOUNTER FOR SCREENING INVOLVING SOCIAL DETERMINANTS OF HEALTH (SDOH): ICD-10-CM

## 2023-12-21 LAB
CHOLEST SERPL-MCNC: 200 MG/DL
FASTING STATUS PATIENT QL REPORTED: NO
HDLC SERPL-MCNC: 29 MG/DL
LDLC SERPL CALC-MCNC: 112 MG/DL
NONHDLC SERPL-MCNC: 171 MG/DL
TRIGL SERPL-MCNC: 294 MG/DL

## 2023-12-21 PROCEDURE — 36415 COLL VENOUS BLD VENIPUNCTURE: CPT | Performed by: PATHOLOGY

## 2023-12-21 PROCEDURE — 91320 SARSCV2 VAC 30MCG TRS-SUC IM: CPT

## 2023-12-21 PROCEDURE — 99214 OFFICE O/P EST MOD 30 MIN: CPT | Mod: 25

## 2023-12-21 PROCEDURE — 80061 LIPID PANEL: CPT | Performed by: PATHOLOGY

## 2023-12-21 PROCEDURE — 90471 IMMUNIZATION ADMIN: CPT

## 2023-12-21 PROCEDURE — 90480 ADMN SARSCOV2 VAC 1/ONLY CMP: CPT

## 2023-12-21 PROCEDURE — 90686 IIV4 VACC NO PRSV 0.5 ML IM: CPT

## 2023-12-21 ASSESSMENT — PATIENT HEALTH QUESTIONNAIRE - PHQ9: SUM OF ALL RESPONSES TO PHQ QUESTIONS 1-9: 3

## 2023-12-21 NOTE — PROGRESS NOTES
Severiano is a 43 year old that presents in clinic today for the following:     Chief Complaint   Patient presents with    Follow Up     Pt would like to discuss dermatology concern on left thumb and results on kidney scan.           12/21/2023     1:42 PM   Additional Questions   Roomed by Crystal Chu   Accompanied by N/A       Screenings as of today     PHQ-9 Total Score 3        Crystal Chu at 1:51 PM on 12/21/2023          Administered Flu vaccine and Covid Pfizer 1172-8824 vaccine (see Immunizations in Chart Review). Patient tolerated well.      Influenza Fluzone vaccine  LOT: [id number]     Covid Pfizer [id number] vaccine  LOT: [id number]    Both vaccine administered on left deltoid.  Vaccine administered by Hector Selby CMA at 3:09 PM on 12/21/2023

## 2023-12-21 NOTE — COMMUNITY RESOURCES LIST (ENGLISH)
12/21/2023   [facility] - Outpatient Clinics  N/A  For additional resource needs, please contact your health insurance member services or your primary care team.  Phone: [phone]   Email: N/A   Address: [address] [place] [id number]   Hours: N/A        Financial Stability       Rent and mortgage payment assistance  1  [employer] - Emergency Assistance [place] - Rent and mortgage payment assistance Distance: 1.88 miles      In-Person, Phone/Virtual   [address] [place] [address]  Language: English, Guyanese  Hours: Mon - Fri 9:00 AM - 4:00 PM  Fees: Free   Phone: (393) 688-5358 Email: info@[employer].org Website: http://www.[employer].org     2  [facility] Stability - Rent and Mortgage Payment Assistance Distance: 3.25 miles      Phone/Virtual   [address] [place] [address]  Language: English, Hmong, Guyanese  Hours: Mon - Fri Appt. Only  Fees: Free   Phone: (908) 292-9520 Website: https://[facility].org/          Important Numbers & Websites       [facility]   [address] [place], [place] [address]way.org  Poison Control   (137) 230-5593 [place]  Suicide and Crisis Lifeline   [address] [place], [place] [address]line.org  Childhelp [place] Child Abuse Hotline   205.525.4035 Childhelphotline.org  National Sexual Assault Hotline   (933) 548-8060 (HOPE) Rainn.org  National Runaway Safeline   (665) 883-3563 (RUNAWAY) [facility]  Pregnancy & Postpartum Support [place]   Call/text 959-642-8507 Ppsupport[place].org  Substance Abuse National Helpline ([facility]   412-217-HELP (8432) Findtreatment.gov  Emergency Services   911

## 2023-12-21 NOTE — COMMUNITY RESOURCES LIST (ENGLISH)
12/21/2023   [facility] [employer] it!  N/A  For questions about this resource list or additional care needs, please contact your primary care clinic or care manager.  Phone: [phone]   Email: N/A   Address: [address] [place] [id number]   Hours: N/A        Financial Stability       Rent and mortgage payment assistance  1  [employer] - Emergency Assistance [place] - Rent and mortgage payment assistance Distance: 1.88 miles      In-Person, Phone/Virtual   [address] [place] [address]  Language: English, American  Hours: Mon - Fri 9:00 AM - 4:00 PM  Fees: Free   Phone: (493) 639-9340 Email: info@[employer]Mama Website: http://www.[email or web address]     2  [facility] - [facility] [place] Stability - Rent and Mortgage Payment Assistance Distance: 3.25 miles      Phone/Virtual   [address] [place] [address]  Language: English, Hmong, American  Hours: Mon - Fri Appt. Only  Fees: Free   Phone: (513) 630-8515 Website: [email or web address].org/          Important Numbers & Websites       Emergency Services   911  City Services   311  Poison Control   (255) 728-2670  Suicide Prevention Lifeline   (446) 983-3844 (TALK)  Child Abuse Hotline   (280) 405-9495 (4-A-Child)  Sexual Assault Hotline   (419) 917-4662 (HOPE)  National Runaway Safeline   (862) 896-4670 (RUNAWAY)  All-Options Talkline   (974) 471-3442  Substance Abuse Referral   (943) 584-1614 (HELP)

## 2023-12-21 NOTE — COMMUNITY RESOURCES LIST (ENGLISH)
12/21/2023   [facility] - Outpatient Clinics  N/A  For additional resource needs, please contact your health insurance member services or your primary care team.  Phone: [phone]   Email: N/A   Address: [address] [place] [address]   Hours: N/A        Financial Stability       Rent and mortgage payment assistance  1  [employer] - Emergency Assistance [place] - Rent and mortgage payment assistance Distance: 1.88 miles      In-Person, Phone/Virtual   [address] [place] [address]  Language: English, Colombian  Hours: Mon - Fri 9:00 AM - 4:00 PM  Fees: Free   Phone: (417) 300-8818 Email: info@[employer].org Website: http://www.[employer].org     2  [facility] [place] Stability - Rent and Mortgage Payment Assistance Distance: 3.25 miles      Phone/Virtual   [address] [place] [address]  Language: English, Hmong, Colombian  Hours: Mon - Fri Appt. Only  Fees: Free   Phone: (295) 702-4895 Website: https://[facility].org/          Important Numbers & Websites       [facility]   [address] [place], [place] [address]way.org  Poison Control   (139) 929-3537 [place].org  Suicide and Crisis Lifeline   [address] [place], [place] [address]line.org  Childhelp [place] Child Abuse Hotline   785.495.7539 Childhelphotline.org  National Sexual Assault Hotline   (806) 382-1679 (HOPE) Rainn.org  National Runaway Safeline   (294) 133-6454 (RUNAWAY) [facility] Hospital[facility]  Pregnancy & Postpartum Support [place]   Call/text 689-457-4367 Ppsupportmn.org  Substance Abuse National Helpline ([facility]   345-148-HELP (3474) Findtreatment.gov  Emergency Services   911

## 2023-12-21 NOTE — PROGRESS NOTES
"I, Tony Espinoza MD saw the patient with the resident, and agree with the resident's findings and plan of care as documented in the resident's note.  /88 (BP Location: Left arm, Patient Position: Sitting, Cuff Size: Adult Large)   Pulse 77   Ht 1.778 m (5' 10\")   Wt 144.4 kg (318 lb 6.4 oz)   SpO2 96%   BMI 45.69 kg/m    I personally reviewed vital signs and past record.  Key findings: multiple chronic medical problems.  Improved depression/anxiety with fluoxetine.    "

## 2023-12-21 NOTE — PROGRESS NOTES
"                     PRIMARY CARE CENTER       SUBJECTIVE:  Severiano Negrete is a 43 year old male with a PMHx of  RCC s/p Right nephrectomy 2019 (done in [place]), Depression ,Anxiety, Obesity, and alcohol use disorder (in remission) who comes in for follow up.     He continues to be in treatment at the sober [facility] since May.  He has been sober for 7 months, he also quit smoking 7 months ago. One of his frustration is putting on weight. [place]  which requires a lot of walking. Hoping he will lose some weight. 12 lbs weight gain since September.     Depression/anxiety is getting better, especially since he started working.     He wants his left thumb wart addressed today.     Medications and allergies reviewed by me today.     ROS:   Constitutional, neuro, ENT, endocrine, pulmonary, cardiac, gastrointestinal, genitourinary, musculoskeletal, integument and psychiatric systems are negative, except as otherwise noted.    OBJECTIVE:    /88 (BP Location: Left arm, Patient Position: Sitting, Cuff Size: Adult Large)   Pulse 77   Ht 1.778 m (5' 10\")   Wt 144.4 kg (318 lb 6.4 oz)   SpO2 96%   BMI 45.69 kg/m     Wt Readings from Last 1 Encounters:   12/21/23 144.4 kg (318 lb 6.4 oz)     Gen: NAD, alert, cooperative, non-toxic  Resp: CTAB, no crackles or wheezes, no increased WOB  Cardiac: RRR, no S3/S4, no M/R/G appreciated  GI: soft, non-tender, non-distended  Ext: WWP, no edema, no erythema.  Protuberant wart noted on lateral aspect of left thumb along with smaller wart on the lateral distal aspect of left ring finger  Neuro: AOx3, sensation intact b/l  Psych: appropriate mood & affect, no suicidal or homicidal ideation       ASSESSMENT/PLAN:  edith Negrete is a 43 year old male with a PMHx of  RCC s/p Right nephrectomy 2019 (done in [place]), Depression ,Anxiety, Obesity, and alcohol use disorder (in remission) who comes in for follow up. "       #Depression  #Anxiety  Patient started on fluoxetine 20 mg daily at previous visit.  Significant improvement in symptoms, patient tolerated medication well. PHQ9 score 8-->3, GAD7 score 16-->4 today.  Patient not interested in mental health referral at this time.  - Fluoxetine 20 mg daily, refilled at this visit       #Left hand warts  Protuberant wart measuring approx 1x1 cm in size located on lateral aspect of right thumb base, smaller wart noted on left ring finger. Pt used Compound W with no improvement.  3 cycles of nitrogen cryotherapy applied today, no immediate complications noted.  Patient was counseled on side effects such as blisters or wounds etc. Plan to reassess at next visit.   -Return to clinic in 3 weeks for repeat cryotherapy, I assume will need 2-3 sessions.    #Mild hepatic steatosis  Incidental finding on CT abdomen pelvis 9/26/2023, with no worrisome liver lesions noted.  Patient educated on weight loss and healthy diet.  - Lipid panel today     #Hx of right foot fracture, stable  #Right foot sprain, resolved  Hx of right foot fracture that required placement of several screws, was recommended to have these removed many years ago.  Patient was seen by sports medicine on 9/18/2023.  At the current time he does not feel that the hardware is a significant cause for his symptoms and advised against removal of hardware.  However if he continues to experience discomfort in the future and then they would consider referral to surgery  - CTM    #Obesity  Gained 12 lbs since this September. We discussed exercise regimen and diet, pt seems motivated to lose weight. Nutrition service consult placed at last visit, patient did not follow-up.  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     #Hx of RCC s/p nephrectomy 2019  Patient seen by urology on 9/19/2023.  CT abdomen pelvis with and without contrast was obtained which showed  no findings of recurrent or metastatic disease in the abdomen/pelvis.  Plan to continue yearly follow-up until 5 years  - Urology following    #Hx of HTN  Well controlled off medication, prior on Lisinopril  - CTM    Other orders  -     INFLUENZA VACCINE >6 MONTHS (AFLURIA/FLUZONE)  -     COVID-19 12+ (2023-24) (PFIZER)         Pt should return to clinic for f/u with me in 3 weeks for wart cryotherapy    Jair Dietrich DO, PGY2  Internal Medicine  [facility]   Clinic phone [phone]  Dec 21, 2023    Pt was seen and plan of care discussed with NROI HUMPHREY

## 2023-12-31 ENCOUNTER — HEALTH MAINTENANCE LETTER (OUTPATIENT)
Age: 43
End: 2023-12-31

## 2024-01-09 NOTE — PROGRESS NOTES
"                     PRIMARY CARE CENTER       SUBJECTIVE:  Severiano Negrete is a 43 year old male with a PMHx of RCC s/p Right nephrectomy 2019 (done in [place]), Depression ,Anxiety, Obesity, Metabolic syndrome, dyslipidemia, palmar warts, and alcohol/tobacco use disorder (in remission)  who comes in for follow up.     Left thumb wart not significantly improved since last cryotherapy 2 weeks ago. Pt was recommended to buy wart removers kit OTC, but he did not have time to buy it yet. Pt continues to pick around that area.     In addition we had a long conversation regarding recent lipid panel, which showed elevated LDL and decreased HDL.  Patient quit smoking 7 months ago. Had a relapse episode on new years where he smoked and drank alcohol. Has not had any alcohol or cigarettes since then.  He denies any cardiovascular history.  Patient does report intermittent chest pain, mostly when laying down on his side in bed. Pain is sharp, lasting a few seconds at a time. Pain is minor, can be uncomfortable at times (few months ago).     FH: maternal grandfather had a cardiovascular history. Mom might have had a heart history as well, not sure.     Medications and allergies reviewed by me today.     ROS:   Constitutional, neuro, ENT, endocrine, pulmonary, cardiac, gastrointestinal, genitourinary, musculoskeletal, integument and psychiatric systems are negative, except as otherwise noted.    OBJECTIVE:    /82 (BP Location: Left arm, Patient Position: Sitting, Cuff Size: Adult Large)   Pulse 71   Ht 1.778 m (5' 10\")   Wt 142.3 kg (313 lb 11.2 oz)   SpO2 94%   BMI 45.01 kg/m     Wt Readings from Last 1 Encounters:   01/11/24 142.3 kg (313 lb 11.2 oz)     Gen: NAD, alert, cooperative, non-toxic  Resp: CTAB, no crackles or wheezes, no increased WOB  Cardiac: RRR, no S3/S4, no M/R/G appreciated  GI: soft, non-tender, protuberant abdomen  Ext: WWP, no edema, no erythema.  Protuberant wart noted on lateral aspect of left " thumb along with one smaller wart on the lateral distal aspect of left ring finger (improved)   Neuro: AOx3, sensation intact b/l, no focal deficits  Psych: appropriate mood & affect       ASSESSMENT/PLAN:  edith Negrete is a 43 year old male with a PMHx of RCC s/p Right nephrectomy 2019 (done in [place]), Depression ,Anxiety, Obesity, Metabolic syndrome, dyslipidemia, palmar warts, and alcohol/tobacco use disorder (in remission)  who comes in for follow up.     #Left hand warts, s/p 3 cryotherapy sessions  Protuberant wart measuring approx 1x1 cm in size located on lateral aspect of right thumb base, smaller wart noted on left ring finger (improved). Pt used Compound W with no improvement.  3 cycles of nitrogen cryotherapy applied today, no immediate complications noted.  Patient was counseled on side effects such as blisters or wounds etc. Plan to reassess at next visit.   -Return to clinic in 3 weeks for repeat cryotherapy. Advised patient to apply OTC Salicylic acid wart removal meanwhile and refrain from picking that area any further.     #Dyslipidemia  #Metabolic syndrome  Lipid panel 12/21/23: Total cholesterol 200, HDL 29, , Triglycerides 294. Pt quit smoking approx 6 month ago. 10 years CVD risk 10.3%. No personal cardiovascular or stroke hx, possible CV history in mother and maternal grandfather.   - Educated patient on healthy Mediterranean diet  - Calcium CT scan  - A1C today     #Depression  #Anxiety  Patient started on fluoxetine 20 mg daily at previous visit.  Significant improvement in symptoms, patient tolerated medication well. PHQ9 score 8-->3, GAD7 score 16-->4. Patient not interested in mental health referral at this time.  - Fluoxetine 20 mg daily       #Mild hepatic steatosis  Incidental finding on CT abdomen pelvis 9/26/2023, with no worrisome liver lesions noted.  Patient educated on weight loss and healthy diet.     #Obesity  Gained 12 lbs since this September. We discussed exercise  regimen and diet, pt seems motivated to lose weight. Nutrition service consult placed at last visit, patient did not follow-up.  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    #Hx of alcohol use disorder  #Hx of tobacco use  Pt has been living in a sobriety home for the past 6-7 months. Has been sober since then, however he did have a relapse episode on new years where he drank alcohol and smoked, no additional tobacco/alcohol use since then.      #Hx of RCC s/p nephrectomy 2019  Patient seen by urology on 9/19/2023.  CT abdomen pelvis with and without contrast was obtained which showed no findings of recurrent or metastatic disease in the abdomen/pelvis.  Plan to continue yearly follow-up until 5 years  - Urology following       #Hx of right foot fracture, stable  #Right foot sprain, resolved  Hx of right foot fracture that required placement of several screws, was recommended to have these removed many years ago.  Patient was seen by sports medicine on 9/18/2023.  At the current time he does not feel that the hardware is a significant cause for his symptoms and advised against removal of hardware.  However if he continues to experience discomfort in the future and then they would consider referral to surgery  - CTM     #Hx of HTN  Well controlled off medication, prior on Lisinopril  - CTM    Pt should return to clinic for f/u with me in 3 week for warts, lab/imaging follow up.     Jair Dietrich DO, PGY2  Internal Medicine  [facility]   Clinic phone [phone]  Jan 11, 2024    Pt was seen and plan of care discussed with NORI Molina

## 2024-01-11 ENCOUNTER — OFFICE VISIT (OUTPATIENT)
Dept: INTERNAL MEDICINE | Facility: CLINIC | Age: 44
End: 2024-01-11
Payer: COMMERCIAL

## 2024-01-11 ENCOUNTER — LAB (OUTPATIENT)
Dept: LAB | Facility: CLINIC | Age: 44
End: 2024-01-11
Payer: COMMERCIAL

## 2024-01-11 VITALS
HEART RATE: 71 BPM | OXYGEN SATURATION: 94 % | BODY MASS INDEX: 44.91 KG/M2 | HEIGHT: 70 IN | SYSTOLIC BLOOD PRESSURE: 114 MMHG | WEIGHT: 313.7 LBS | DIASTOLIC BLOOD PRESSURE: 82 MMHG

## 2024-01-11 DIAGNOSIS — E66.01 CLASS 3 SEVERE OBESITY WITHOUT SERIOUS COMORBIDITY WITH BODY MASS INDEX (BMI) OF 45.0 TO 49.9 IN ADULT, UNSPECIFIED OBESITY TYPE (H): ICD-10-CM

## 2024-01-11 DIAGNOSIS — E78.5 DYSLIPIDEMIA: ICD-10-CM

## 2024-01-11 DIAGNOSIS — E88.810 METABOLIC SYNDROME X: Primary | ICD-10-CM

## 2024-01-11 DIAGNOSIS — K76.0 HEPATIC STEATOSIS: ICD-10-CM

## 2024-01-11 DIAGNOSIS — E66.813 CLASS 3 SEVERE OBESITY WITHOUT SERIOUS COMORBIDITY WITH BODY MASS INDEX (BMI) OF 45.0 TO 49.9 IN ADULT, UNSPECIFIED OBESITY TYPE (H): ICD-10-CM

## 2024-01-11 DIAGNOSIS — E88.810 METABOLIC SYNDROME X: ICD-10-CM

## 2024-01-11 LAB — HBA1C MFR BLD: 5.4 %

## 2024-01-11 PROCEDURE — 99000 SPECIMEN HANDLING OFFICE-LAB: CPT | Performed by: PATHOLOGY

## 2024-01-11 PROCEDURE — 99213 OFFICE O/P EST LOW 20 MIN: CPT | Mod: GC

## 2024-01-11 PROCEDURE — 36415 COLL VENOUS BLD VENIPUNCTURE: CPT | Performed by: PATHOLOGY

## 2024-01-11 PROCEDURE — 83036 HEMOGLOBIN GLYCOSYLATED A1C: CPT

## 2024-01-11 ASSESSMENT — PAIN SCALES - GENERAL: PAINLEVEL: NO PAIN (1)

## 2024-01-11 NOTE — PROGRESS NOTES
Severiano is a 43 year old that presents in clinic today for the following:     Chief Complaint   Patient presents with    Follow Up     Pt here for follow up            1/11/2024     7:47 AM   Additional Questions   Roomed by MVO       Screenings from encounters over the past 10 days    No data recorded       Al Leonardo at 7:51 AM on 1/11/2024

## 2024-01-11 NOTE — PROGRESS NOTES
"I, Tony Espinoza MD saw the patient with the resident, and agree with the resident's findings and plan of care as documented in the resident's note.  /82 (BP Location: Left arm, Patient Position: Sitting, Cuff Size: Adult Large)   Pulse 71   Ht 1.778 m (5' 10\")   Wt 142.3 kg (313 lb 11.2 oz)   SpO2 94%   BMI 45.01 kg/m    I personally reviewed vital signs and past record.  Key findings: repeat wart treatment.  Tobacco relapse. Question about cardiac risk (counseled about this)      "

## 2024-01-31 NOTE — PROGRESS NOTES
PRIMARY CARE CENTER       SUBJECTIVE:  Severiano Negrete is a 43 year old male with a PMHx of RCC s/p Right nephrectomy 2019 (done in [place]), Depression ,Anxiety, Obesity, Metabolic syndrome, dyslipidemia, palmar warts, and alcohol/tobacco use disorder (in remission)  who comes in for follow up.     Left thumb wart continues to improve, size decreased by ~half.     Pt reports pain around right pinky (near 5 carpal bone) from riding the bicycle. Does not recall any recent injuries to the area, might have fell on that hand a couple years ago when he was inebriated.     Medications and allergies reviewed by me today.     ROS:   Constitutional, neuro, ENT, endocrine, pulmonary, cardiac, gastrointestinal, genitourinary, musculoskeletal, integument and psychiatric systems are negative, except as otherwise noted.    OBJECTIVE:    /89 (BP Location: Left arm, Patient Position: Sitting, Cuff Size: Adult Large)   Pulse 81   Wt 143.4 kg (316 lb 1.6 oz)   SpO2 96%   BMI 45.36 kg/m     Wt Readings from Last 1 Encounters:   02/01/24 143.4 kg (316 lb 1.6 oz)     Gen: NAD, alert, cooperative, non-toxic  Resp: CTAB, no crackles or wheezes, no increased WOB  Cardiac: RRR, no S3/S4, no M/R/G appreciated  GI: soft, non-tender, protuberant abdomen  Ext: WWP, no edema, no erythema. No bone abnormalities noted in both hands including carpal and metacarpal bones. Protuberant wart noted on lateral aspect of left thumb along with one smaller wart on the lateral distal aspect of left ring finger, continues to improve  Neuro: AOx3, sensation intact b/l, no focal deficits  Psych: appropriate mood & affect       ASSESSMENT/PLAN:    Severiano Negrete is a 43 year old male with a PMHx of RCC s/p Right nephrectomy 2019 (done in [place]), Depression ,Anxiety, Obesity, Metabolic syndrome, dyslipidemia, palmar warts, and alcohol/tobacco use disorder (in remission)  who comes in for follow up.      #Left hand warts, s/p 4 cryotherapy  sessions, improved  Protuberant wart located on lateral aspect of right thumb base, smaller wart noted on left ring finger (improved). Pt used Compound W with no improvement.  Improved overall, size decreased by ~half. Additional 3 cycles of nitrogen cryotherapy applied today, no immediate complications noted.  Patient was counseled on side effects such as blisters or wounds etc. Plan to reassess at next visit.   - Return to clinic in 1 month for repeat cryotherapy. Advised patient to apply OTC Salicylic acid wart removal meanwhile and refrain from picking that area any further.     #Right hand pain  Pt reports pain around right 5th carpal bone, seems to be chronic. Does not recall any recent injuries to the area, might have fell on that hand a couple years ago when he was inebriated.   - Right hand xray   - Diclofenac cream OTC PRN    #Dyslipidemia  #Metabolic syndrome  Lipid panel 12/21/23: Total cholesterol 200, HDL 29, , Triglycerides 294. A1C 5.4% (1/11/24). Pt quit smoking approx 6 month ago. 10 years CVD risk 10.3%. No personal cardiovascular or stroke hx, possible CV history in mother and maternal grandfather.   - Educated patient on healthy Mediterranean diet  - Calcium CT scan ordered at previous visit, not scheduled yet  - Recommended intermittent fasting. Pt will read more about it     #Depression  #Anxiety  Patient started on fluoxetine 20 mg daily at previous visit.  Significant improvement in symptoms, patient tolerated medication well. Currently well controlled. PHQ9 score 8-->3, GAD7 score 16-->4. Patient not interested in mental health referral at this time.  - Fluoxetine 20 mg daily        #Mild hepatic steatosis  Incidental finding on CT abdomen pelvis 9/26/2023, with no worrisome liver lesions noted.  Patient educated on weight loss and healthy diet.     #Obesity  Gained 12 lbs since this September. We discussed exercise regimen and diet, pt seems motivated to lose weight. Nutrition  service consult placed at last visit, patient did not follow-up.  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     #Hx of alcohol use disorder  #Hx of tobacco use  Pt has been living in a sobriety home for the past 6-7 months. Has been sober since then, however he did have a relapse episode on new years (1/1/2024) where he drank alcohol and smoked, no additional tobacco/alcohol use since then.      #Hx of RCC s/p nephrectomy 2019  Patient seen by urology on 9/19/2023.  CT abdomen pelvis with and without contrast was obtained which showed no findings of recurrent or metastatic disease in the abdomen/pelvis.  Plan to continue yearly follow-up until 5 years  - Urology following        #Hx of right foot fracture, stable  #Right foot sprain, resolved  Hx of right foot fracture that required placement of several screws, was recommended to have these removed many years ago.  Patient was seen by sports medicine on 9/18/2023.  At the current time he does not feel that the hardware is a significant cause for his symptoms and advised against removal of hardware.  However if he continues to experience discomfort in the future and then they would consider referral to surgery  - CTM     #Hx of HTN  Well controlled off medication, prior on Lisinopril  - CTM    Severiano was seen today for follow up.    Diagnoses and all orders for this visit:    Pain of finger of right hand  -     XR Hand Right G/E 3 Views; Future    Other orders  -     REVIEW OF HEALTH MAINTENANCE PROTOCOL ORDERS         Pt should return to clinic for f/u with me in 1 month     Jair Dietrich DO, PGY2  Internal Medicine  [facility]   Clinic phone [phone]  Feb 1, 2024    Pt was seen and plan of care discussed with NORI Molina

## 2024-02-01 ENCOUNTER — ANCILLARY PROCEDURE (OUTPATIENT)
Dept: GENERAL RADIOLOGY | Facility: CLINIC | Age: 44
End: 2024-02-01
Payer: COMMERCIAL

## 2024-02-01 ENCOUNTER — OFFICE VISIT (OUTPATIENT)
Dept: INTERNAL MEDICINE | Facility: CLINIC | Age: 44
End: 2024-02-01
Payer: COMMERCIAL

## 2024-02-01 VITALS
SYSTOLIC BLOOD PRESSURE: 139 MMHG | HEART RATE: 81 BPM | WEIGHT: 315 LBS | BODY MASS INDEX: 45.36 KG/M2 | DIASTOLIC BLOOD PRESSURE: 89 MMHG | OXYGEN SATURATION: 96 %

## 2024-02-01 DIAGNOSIS — M79.644 PAIN OF FINGER OF RIGHT HAND: ICD-10-CM

## 2024-02-01 DIAGNOSIS — M79.644 PAIN OF FINGER OF RIGHT HAND: Primary | ICD-10-CM

## 2024-02-01 PROCEDURE — 99213 OFFICE O/P EST LOW 20 MIN: CPT | Mod: GE

## 2024-02-01 PROCEDURE — 73130 X-RAY EXAM OF HAND: CPT | Mod: RT | Performed by: RADIOLOGY

## 2024-02-01 NOTE — PROGRESS NOTES
Severiano is a 43 year old that presents in clinic today for the following:     Chief Complaint   Patient presents with    Follow Up     1. Discuss imaging and warts   2. Pinky finger on R hand has been sore since the summer, started riding bike            2/1/2024     9:29 AM   Additional Questions   Roomed by YW   Accompanied by No     Screenings from encounters over the past 10 days    No data recorded       MYLENE Min at 9:31 AM on 2/1/2024

## 2024-02-01 NOTE — PROGRESS NOTES
I, Tony Espinoza MD discussed with the resident during the visit, and agree with the resident's findings and plan of care as documented in the resident's note. I was immediately available to the patient should the need have arisen.  /89 (BP Location: Left arm, Patient Position: Sitting, Cuff Size: Adult Large)   Pulse 81   Wt 143.4 kg (316 lb 1.6 oz)   SpO2 96%   BMI 45.36 kg/m    I personally reviewed vital signs and past record.  Key findings: continuing wart freezing. Discussed local care.

## 2024-02-14 ENCOUNTER — HOSPITAL ENCOUNTER (OUTPATIENT)
Dept: CT IMAGING | Facility: CLINIC | Age: 44
Discharge: HOME | End: 2024-02-14
Payer: COMMERCIAL

## 2024-02-14 DIAGNOSIS — E78.5 DYSLIPIDEMIA: ICD-10-CM

## 2024-02-14 LAB
CV CALCIUM SCORE AGATSTON LM: 0
CV CALCIUM SCORING AGATSON LAD: 0
CV CALCIUM SCORING AGATSTON CX: 0
CV CALCIUM SCORING AGATSTON RCA: 0
CV CALCIUM SCORING AGATSTON TOTAL: 0

## 2024-02-14 PROCEDURE — 75571 CT HRT W/O DYE W/CA TEST: CPT

## 2024-02-14 PROCEDURE — 75571 CT HRT W/O DYE W/CA TEST: CPT | Mod: 26 | Performed by: INTERNAL MEDICINE

## 2024-03-07 ENCOUNTER — OFFICE VISIT (OUTPATIENT)
Dept: INTERNAL MEDICINE | Facility: CLINIC | Age: 44
End: 2024-03-07
Payer: COMMERCIAL

## 2024-03-07 VITALS
SYSTOLIC BLOOD PRESSURE: 116 MMHG | DIASTOLIC BLOOD PRESSURE: 76 MMHG | WEIGHT: 315 LBS | HEART RATE: 84 BPM | OXYGEN SATURATION: 94 % | HEIGHT: 70 IN | BODY MASS INDEX: 45.1 KG/M2

## 2024-03-07 DIAGNOSIS — F41.1 GAD (GENERALIZED ANXIETY DISORDER): ICD-10-CM

## 2024-03-07 DIAGNOSIS — B07.9 VIRAL WART ON LEFT THUMB: Primary | ICD-10-CM

## 2024-03-07 PROCEDURE — 99213 OFFICE O/P EST LOW 20 MIN: CPT | Mod: GE

## 2024-03-07 ASSESSMENT — PAIN SCALES - GENERAL: PAINLEVEL: MILD PAIN (3)

## 2024-03-07 NOTE — PROGRESS NOTES
Assessment & Plan     Severiano Negrete is a 43 year old male with a PMHx of RCC s/p Right nephrectomy 2019 (done in [place]), Depression ,Anxiety, Obesity, Metabolic syndrome, dyslipidemia, palmar warts, and alcohol/tobacco use disorder (in remission)  who comes in for follow up.      #Left hand warts, s/p 5 cryotherapy sessions, stable  Protuberant wart located on lateral aspect of right thumb base, smaller wart noted on left ring finger (improved). Pt used Compound W with no improvement. No significant improvement overall despite 5 cycles of cryotherapy spread over 5 office visit.  - Dermatology referral placed  - Continue OTC Salicylic acid wart removal (just started 2 days ago) and refrain from picking that area any further.      #Right hand pain  Right hand xray at previous visit with no acute abnormalities, mild degenerative findings.  - Diclofenac cream OTC PRN     #Dyslipidemia  #Metabolic syndrome  Lipid panel 12/21/23: Total cholesterol 200, HDL 29, , Triglycerides 294. A1C 5.4% (1/11/24). Pt quit smoking approx 6 month ago. 10 years CVD risk 10.3%. No personal cardiovascular or stroke hx, possible CV history in mother and maternal grandfather. CT calcium obtained on 2/14/24, calcium score 0.   - Will hold off stating for now and focus on improving diet/exercise. Educated patient on healthy Mediterranean diet and encouraged weight loss.  - Follow up in 6 months, will consider weight loss clinic consult if there is no significant weight loss by then.      #Depression, improved  #Anxiety, improved  Patient started on fluoxetine 20 mg daily at previous visit.  Significant improvement in symptoms, patient tolerated medication well. Currently well controlled. PHQ9 score 8-->3, GAD7 score 16-->4. Patient not interested in mental health referral at this time.  - Fluoxetine 20 mg daily        #Mild hepatic steatosis  Incidental finding on CT abdomen pelvis 9/26/2023, with no worrisome liver lesions noted.   "Patient educated on weight loss and healthy diet.     #Obesity  Gained 12 lbs since this September. We discussed exercise regimen and diet, pt seems motivated to lose weight. Nutrition service consult placed at last visit, patient did not follow-up.  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  - Follow up in 6 months, consider weight loss clinic consult at that time if no improvement in weight loss     #Hx of alcohol use disorder  #Hx of tobacco use  Pt has been living in a sobriety home for the past 6-7 months. Has been sober since then, however he did have a relapse episode on new years (1/1/2024) where he drank alcohol and smoked, had a couple more cigarettes since due to social anxiety.     #Hx of RCC s/p nephrectomy 2019  Patient seen by urology on 9/19/2023.  CT abdomen pelvis with and without contrast was obtained which showed no findings of recurrent or metastatic disease in the abdomen/pelvis.  Plan to continue yearly follow-up until 5 years  - Urology following        #Hx of right foot fracture, stable  #Right foot sprain, resolved  Hx of right foot fracture that required placement of several screws, was recommended to have these removed many years ago.  Patient was seen by sports medicine on 9/18/2023.  At the current time he does not feel that the hardware is a significant cause for his symptoms and advised against removal of hardware.  However if he continues to experience discomfort in the future and then they would consider referral to surgery  - CTM     #Hx of HTN  Well controlled off medication, prior on Lisinopril  - CTM      BMI  Estimated body mass index is 45.33 kg/m  as calculated from the following:    Height as of this encounter: 1.778 m (5' 10\").    Weight as of this encounter: 143.3 kg (315 lb 14.4 oz).     Wilman العلي is a 43 year old, presenting for the following health issues:  Follow Up (Pt here to follow " up )        3/7/2024     1:10 PM   Additional Questions   Roomed by MVO, EMT     History of Present Illness       Hyperlipidemia:  He presents for follow up of hyperlipidemia.   He is not taking medication to lower cholesterol. He is not having myalgia or other side effects to statin medications.    He eats 0-1 servings of fruits and vegetables daily.He consumes 0 sweetened beverage(s) daily.He exercises with enough effort to increase his heart rate 9 or less minutes per day.  He exercises with enough effort to increase his heart rate 3 or less days per week. He is missing 1 dose(s) of medications per week.     Patient presents today to clinic for routine follow-up.  Since last visit patient has not seen any significant improvement in left hand wart.  Unfortunately continues to pick at it often but he is finally started applying over-the-counter Dr. Mohinder salazar removal Band-Aids.    He experiences occasional sharp chest pain which lasts couple of seconds.  This is reproducible with movement.  He denies any pressure-like chest pain, does not radiate anywhere.  Of note patient has similar complaints about a year ago we obtained EKG which is reassuring with normal sinus rhythm and no ischemic findings at that time.    He continues to stay sober and has not been drinking.  However he did have a couple cigarettes last week and when he found himself on the big crowd, smoked 2 cigarettes to calm down and control his social anxiety.    Currently he is working [place] starting [place], he also has another job lined up sometime in May.  He hopes that with this job he will be able to walk a lot more and hopefully lose some weight.  We had a long discussions regarding weight loss and diet, as well as exercise.  Plan is for repeat visit in 6 months, if no significant weight loss by then will consider weight loss clinic consult.    Otherwise he denies any fever, chills, nausea, vomiting, shortness of  "breath, palpitation, abdominal pain, diarrhea, constipation, urinary symptoms.        Objective    /76 (BP Location: Right arm, Patient Position: Sitting, Cuff Size: Adult Large)   Pulse 84   Ht 1.778 m (5' 10\")   Wt 143.3 kg (315 lb 14.4 oz)   SpO2 94%   BMI 45.33 kg/m    Body mass index is 45.33 kg/m .  Physical Exam   Gen: NAD, alert, cooperative, non-toxic  Resp: CTAB, no crackles or wheezes, no increased WOB  Cardiac: RRR, no S3/S4, no M/R/G appreciated  GI: soft, non-tender, non-distended  Ext: WWP. protuberant wart approx 1cm in size located on left thumb.   Neuro: AOx3, sensation intact b/l          Signed Electronically by:  Jair Dietrich DO, PGY2  Internal Medicine  "

## 2024-03-07 NOTE — PATIENT INSTRUCTIONS
Please call to schedule 6 month follow up around 9/7/24 with Dr. Dietrich. To schedule your lab appointment please call [phone].

## 2024-07-25 ENCOUNTER — VIRTUAL VISIT (OUTPATIENT)
Dept: FAMILY MEDICINE | Facility: CLINIC | Age: 44
End: 2024-07-25
Payer: COMMERCIAL

## 2024-07-25 DIAGNOSIS — R20.2 PARESTHESIA: Primary | ICD-10-CM

## 2024-07-25 PROCEDURE — 99442 PR PHYSICIAN TELEPHONE EVALUATION 11-20 MIN: CPT | Mod: 95 | Performed by: FAMILY MEDICINE

## 2024-07-25 ASSESSMENT — PATIENT HEALTH QUESTIONNAIRE - PHQ9
SUM OF ALL RESPONSES TO PHQ QUESTIONS 1-9: 3
SUM OF ALL RESPONSES TO PHQ QUESTIONS 1-9: 3
10. IF YOU CHECKED OFF ANY PROBLEMS, HOW DIFFICULT HAVE THESE PROBLEMS MADE IT FOR YOU TO DO YOUR WORK, TAKE CARE OF THINGS AT HOME, OR GET ALONG WITH OTHER PEOPLE: NOT DIFFICULT AT ALL
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
## 2024-07-30 NOTE — PATIENT INSTRUCTIONS
-Thank you for choosing the [facility].  -It was a pleasure to see you today.  -Please take a look at the information below for more specific details regarding the treatment plan and recommendations.  -In this after visit summary is a list of your medications and specific instructions.  Please review this carefully as there may be changes made to your medication list.  -If there are any particular questions or concerns, please feel free to reach out to Dr. Babin.  -If any labs have been completed, we will reach out to you about results.  If the results are normal or not concerning, a letter or [employer] message will be sent to you.  If any follow-up is needed, either Dr. Babin or the nurse will give you a call.  If you have not heard regarding results after 2 weeks, please reach out to the clinic.    Patient Instructions:    -Call to schedule lab only and x-ray only appointments to complete the orders as placed during the visit.    -Further recommendations will depend on results.      Please seek immediate medical attention (go to the emergency room or urgent care) for the following reasons: worsening symptoms, or any concerning changes.      --------------------------------------------------------------------------------------------------------------------    -We are always looking for ways to improve.  You may be selected to receive a survey regarding your visit today.  We encourage you to complete the survey and provide specific, constructive feedback to help us improve our processes.  Thank you for your time!  -Please review the contact information listed on the after visit summary and in the electronic chart.  Below is the phone number that we have on file.  If there are any changes that are needed to be made, please reach out to the clinic.  [phone] (home)

## 2024-08-08 ENCOUNTER — HOSPITAL ENCOUNTER (OUTPATIENT)
Dept: GENERAL RADIOLOGY | Facility: HOSPITAL | Age: 44
Discharge: HOME | End: 2024-08-08
Attending: FAMILY MEDICINE | Admitting: FAMILY MEDICINE
Payer: COMMERCIAL

## 2024-08-08 DIAGNOSIS — R20.2 PARESTHESIA: ICD-10-CM

## 2024-08-08 PROCEDURE — 72100 X-RAY EXAM L-S SPINE 2/3 VWS: CPT

## 2024-12-31 ENCOUNTER — OFFICE VISIT (OUTPATIENT)
Dept: FAMILY MEDICINE | Facility: CLINIC | Age: 44
End: 2024-12-31
Payer: COMMERCIAL

## 2024-12-31 VITALS
RESPIRATION RATE: 22 BRPM | BODY MASS INDEX: 46.65 KG/M2 | OXYGEN SATURATION: 97 % | HEIGHT: 69 IN | WEIGHT: 315 LBS | SYSTOLIC BLOOD PRESSURE: 111 MMHG | DIASTOLIC BLOOD PRESSURE: 71 MMHG | TEMPERATURE: 98.1 F | HEART RATE: 79 BPM

## 2024-12-31 DIAGNOSIS — F33.42 RECURRENT MAJOR DEPRESSIVE DISORDER, IN FULL REMISSION (H): ICD-10-CM

## 2024-12-31 DIAGNOSIS — Z12.11 SCREEN FOR COLON CANCER: ICD-10-CM

## 2024-12-31 DIAGNOSIS — E66.813 CLASS 3 SEVERE OBESITY WITHOUT SERIOUS COMORBIDITY WITH BODY MASS INDEX (BMI) OF 45.0 TO 49.9 IN ADULT, UNSPECIFIED OBESITY TYPE (H): ICD-10-CM

## 2024-12-31 DIAGNOSIS — I10 BENIGN HYPERTENSION: Primary | ICD-10-CM

## 2024-12-31 DIAGNOSIS — E66.01 CLASS 3 SEVERE OBESITY WITHOUT SERIOUS COMORBIDITY WITH BODY MASS INDEX (BMI) OF 45.0 TO 49.9 IN ADULT, UNSPECIFIED OBESITY TYPE (H): ICD-10-CM

## 2024-12-31 DIAGNOSIS — R73.09 ELEVATED GLUCOSE: ICD-10-CM

## 2024-12-31 DIAGNOSIS — F41.1 GAD (GENERALIZED ANXIETY DISORDER): ICD-10-CM

## 2024-12-31 PROBLEM — Z85.528 HISTORY OF KIDNEY CANCER: Status: ACTIVE | Noted: 2023-01-04

## 2024-12-31 LAB
CHOLEST SERPL-MCNC: 226 MG/DL
EST. AVERAGE GLUCOSE BLD GHB EST-MCNC: 105 MG/DL
FASTING STATUS PATIENT QL REPORTED: NO
HBA1C MFR BLD: 5.3 % (ref 0–5.6)
HDLC SERPL-MCNC: 31 MG/DL
LDLC SERPL CALC-MCNC: ABNORMAL MG/DL
LDLC SERPL DIRECT ASSAY-MCNC: 150 MG/DL
NONHDLC SERPL-MCNC: 195 MG/DL
TRIGL SERPL-MCNC: 405 MG/DL

## 2024-12-31 PROCEDURE — G2211 COMPLEX E/M VISIT ADD ON: HCPCS | Performed by: PHYSICIAN ASSISTANT

## 2024-12-31 PROCEDURE — 36415 COLL VENOUS BLD VENIPUNCTURE: CPT | Performed by: PHYSICIAN ASSISTANT

## 2024-12-31 PROCEDURE — 96127 BRIEF EMOTIONAL/BEHAV ASSMT: CPT | Performed by: PHYSICIAN ASSISTANT

## 2024-12-31 PROCEDURE — 83036 HEMOGLOBIN GLYCOSYLATED A1C: CPT | Performed by: PHYSICIAN ASSISTANT

## 2024-12-31 PROCEDURE — 83721 ASSAY OF BLOOD LIPOPROTEIN: CPT | Mod: 59 | Performed by: PHYSICIAN ASSISTANT

## 2024-12-31 PROCEDURE — 80061 LIPID PANEL: CPT | Performed by: PHYSICIAN ASSISTANT

## 2024-12-31 PROCEDURE — 99214 OFFICE O/P EST MOD 30 MIN: CPT | Performed by: PHYSICIAN ASSISTANT

## 2024-12-31 RX ORDER — METFORMIN HYDROCHLORIDE 500 MG/1
500 TABLET, EXTENDED RELEASE ORAL
Qty: 90 TABLET | Refills: 1 | Status: SHIPPED | OUTPATIENT
Start: 2024-12-31

## 2024-12-31 RX ORDER — AMLODIPINE BESYLATE 10 MG/1
TABLET ORAL
COMMUNITY
Start: 2024-12-13

## 2024-12-31 ASSESSMENT — PATIENT HEALTH QUESTIONNAIRE - PHQ9: SUM OF ALL RESPONSES TO PHQ QUESTIONS 1-9: 6

## 2024-12-31 ASSESSMENT — PAIN SCALES - GENERAL: PAINLEVEL_OUTOF10: NO PAIN (0)

## 2024-12-31 NOTE — PROGRESS NOTES
"  Assessment & Plan     (I10) Benign hypertension  (primary encounter diagnosis)  Comment:   Plan: Lipid panel reflex to direct LDL Non-fasting        Blood pressure well controlled today. Advised to continue with current medication regimen and follow up in 12 months. Stressed the importance of regular blood pressure monitoring outside of clinic, regular aerobic exercise, low salt diet, refraining from smoking/tobacco products and continued abstaining from alcohol use.      (E66.813,  E66.01,  Z68.42) Class 3 severe obesity without serious comorbidity with body mass index (BMI) of 45.0 to 49.9 in adult, unspecified obesity type (H)  Comment:   Plan: Lipid panel reflex to direct LDL Non-fasting          Based on previous pancreatitis we will hold off on GLP-1 medications at this time.  Start metformin 500 mg XR and follow-up in 3 months.  Also discussed lifestyle changes today, he will continue to work on slowly advancing walking program and possibly getting to [employer] near his group home    (R73.09) Elevated glucose  Comment:   Plan: Hemoglobin A1c            (Z12.11) Screen for colon cancer  Comment:   Plan: Colonoscopy Screening  Referral            (F41.1) ROXANA (generalized anxiety disorder)  Comment:   Plan: FLUoxetine (PROZAC) 20 MG capsule              Chronic condition well-controlled today, no medication changes made, follow-up in 1 year but sooner if any worsening symptoms.      (F33.42) Recurrent major depressive disorder, in full remission (H)  Comment:   Plan:       Chronic condition well-controlled today, no medication changes made, follow-up in 1 year but sooner if any worsening symptoms.      BMI  Estimated body mass index is 48.59 kg/m  as calculated from the following:    Height as of this encounter: 1.756 m (5' 9.13\").    Weight as of this encounter: 149.8 kg (330 lb 4.8 oz).             Wilman العلي is a 44 year old, presenting for the following health issues:  Establish Care (Kidney " follow up) and Hypertension      12/31/2024     9:01 AM   Additional Questions   Roomed by Mariel JONES         12/31/2024   Forms   Any forms needing to be completed Yes     History of Present Illness       Reason for visit:  Establish care   He is taking medications regularly.       Patient presents today to establish care with new primary care clinic.  Patient is currently living in inpatient sober living.  Hoping to transfer to intensive outpatient program over the next several months.  Patient has been sober for greater than 2 months now.    Current group home living has been monitoring blood pressure daily, occasional reading greater than 140/90 but otherwise well-controlled with amlodipine 10 mg.  No side effects from medication voiced by the patient.    Mental health anxiety and depression currently well-controlled with fluoxetine 40 mg.    Patient noting that within the past 2 years he has gained approximately 60 pounds, wondering about weight loss medications.  In the past he has had pancreatitis due to severe alcohol use, previous history of renal cell carcinoma status post nephrectomy.  He does have known comorbid conditions of dyslipidemia, hypertension and hepatic steatosis.        Objective    There were no vitals taken for this visit.  There is no height or weight on file to calculate BMI.  Physical Exam   GENERAL: healthy, alert and no distress  EYES: Eyes grossly normal to inspection, EOM intact and conjunctivae normal  RESP: breathing comfortably on room air  PSYCH: mentation appears normal, affect normal/bright              Signed Electronically by: Tristen Andrews PA-C

## 2025-01-02 ENCOUNTER — OFFICE VISIT (OUTPATIENT)
Dept: URGENT CARE | Facility: URGENT CARE | Age: 45
End: 2025-01-02
Payer: COMMERCIAL

## 2025-01-02 VITALS
HEIGHT: 69 IN | DIASTOLIC BLOOD PRESSURE: 86 MMHG | HEART RATE: 81 BPM | WEIGHT: 315 LBS | TEMPERATURE: 98.4 F | OXYGEN SATURATION: 97 % | RESPIRATION RATE: 18 BRPM | BODY MASS INDEX: 46.65 KG/M2 | SYSTOLIC BLOOD PRESSURE: 138 MMHG

## 2025-01-02 DIAGNOSIS — H60.92 INFLAMMATION OF LEFT EAR CANAL: Primary | ICD-10-CM

## 2025-01-02 PROCEDURE — 99213 OFFICE O/P EST LOW 20 MIN: CPT | Performed by: FAMILY MEDICINE

## 2025-01-02 RX ORDER — NEOMYCIN SULFATE, POLYMYXIN B SULFATE, HYDROCORTISONE 3.5; 10000; 1 MG/ML; [USP'U]/ML; MG/ML
3 SOLUTION/ DROPS AURICULAR (OTIC) 3 TIMES DAILY
Qty: 10 ML | Refills: 0 | Status: SHIPPED | OUTPATIENT
Start: 2025-01-02 | End: 2025-01-07

## 2025-01-02 NOTE — PROGRESS NOTES
Assessment & Plan     Inflammation of left ear canal  ***  - neomycin-polymyxin-hydrocortisone (CORTISPORIN) 3.5-28194-3 otic solution  Dispense: 10 mL; Refill: 0       Not classic for bullous myringitis although peculiar small section of TM with small vesicles which appear clear. Mild inflammation of canal present thus discouraged placing any more objects in canal -- cortisporin advised for 5 days.       Hi Veronica MD   [place] [facility]    Subjective     Severiano is a 44 year old male who presents to clinic today for the following health issues:  Chief Complaint   Patient presents with    Urgent Care     Ear pain on left side since yesterday morning. Some trouble hearing.      HPI    No recent ear infections. Has been using a plastic device to place in his left ear due to the irritation. Some decreased hearing. No ear discharge. Denies fever. No sinus infection        Patient Active Problem List    Diagnosis Date Noted    Dyslipidemia 01/11/2024     Priority: Medium    Metabolic syndrome X 01/11/2024     Priority: Medium    Hepatic steatosis 01/11/2024     Priority: Medium    Class 3 severe obesity without serious comorbidity in adult (H) 01/11/2024     Priority: Medium    Depression 07/20/2023     Priority: Medium    Anxiety 07/20/2023     Priority: Medium    Renal cell carcinoma, right (H) 07/20/2023     Priority: Medium    Asymptomatic varicose veins of both lower extremities 07/20/2023     Priority: Medium    Viral wart on left thumb 07/20/2023     Priority: Medium    Substance use disorder 07/20/2023     Priority: Medium    History of kidney cancer 01/04/2023     Priority: Medium       Current Outpatient Medications   Medication Sig Dispense Refill    amLODIPine (NORVASC) 10 MG tablet       FLUoxetine (PROZAC) 20 MG capsule Take 2 capsules (40 mg) by mouth daily. 30 capsule 6    metFORMIN (GLUCOPHAGE XR) 500 MG 24 hr tablet Take 1 tablet (500 mg) by mouth daily (with dinner). 90 tablet 1     "neomycin-polymyxin-hydrocortisone (CORTISPORIN) 3.5-18971-5 otic solution Place 3 drops Into the left ear 3 times daily for 5 days. 10 mL 0     No current facility-administered medications for this visit.           Objective    /86   Pulse 81   Temp 98.4  F (36.9  C) (Temporal)   Resp 18   Ht 1.753 m (5' 9\")   Wt 149.7 kg (330 lb)   SpO2 97%   BMI 48.73 kg/m    Physical Exam   As noted above and including:   GEN: ***  Ears normal right canal and Tm  Left Tm lower central portion (40% of TM surface) there is mild redness and clear vesicles . No purulent fluid behind TM. Scaly/dry skin at the entry of ear canal with mild inflammation in central portion  No results found for any visits on 01/02/25.                  The use of Dragon/[employer] dictation services may have been used to construct the content in this note; any grammatical or spelling errors are non-intentional. Please contact the author of this note directly if you are in need of any clarification.   "

## 2025-01-06 ENCOUNTER — OFFICE VISIT (OUTPATIENT)
Dept: URGENT CARE | Facility: URGENT CARE | Age: 45
End: 2025-01-06
Payer: COMMERCIAL

## 2025-01-06 VITALS
OXYGEN SATURATION: 96 % | TEMPERATURE: 97.9 F | SYSTOLIC BLOOD PRESSURE: 128 MMHG | BODY MASS INDEX: 46.65 KG/M2 | HEART RATE: 77 BPM | WEIGHT: 315 LBS | DIASTOLIC BLOOD PRESSURE: 81 MMHG | RESPIRATION RATE: 17 BRPM | HEIGHT: 69 IN

## 2025-01-06 DIAGNOSIS — H60.392 CHRONIC BACTERIAL OTITIS EXTERNA OF LEFT EAR: Primary | ICD-10-CM

## 2025-01-06 RX ORDER — CEPHALEXIN 500 MG/1
500 CAPSULE ORAL 2 TIMES DAILY
Qty: 20 CAPSULE | Refills: 0 | Status: SHIPPED | OUTPATIENT
Start: 2025-01-06 | End: 2025-01-16

## 2025-01-06 ASSESSMENT — PAIN SCALES - GENERAL: PAINLEVEL_OUTOF10: MILD PAIN (3)

## 2025-01-06 NOTE — PATIENT INSTRUCTIONS
Please have Walker Penelope come back in 2-3 days for ear lavage     Treating for infection with antibiotics

## 2025-01-09 NOTE — PROGRESS NOTES
"Patient presents with:  Otalgia: Pain in left ear not improving, hearing loss in ear, slight pain in jaw   Urgent Care      Assessment/MDM:    Severiano Negrete is a 44 year old male is here today for otitis externa . the following systemic symptoms are present : pain and swelling .  I am going to treat with antibiotics follow-up as needed        HPI:  Seen earlier this week for otitis externa not doing better, now swollen along cheek and ear         Review of Systems   All other systems reviewed and are negative.      Vitals:    25 1448   BP: 128/81   Pulse: 77   Resp: 17   Temp: 97.9  F (36.6  C)   TempSrc: Temporal   SpO2: 96%   Weight: 149.7 kg (330 lb)   Height: 1.753 m (5' 9\")       Physical Exam  HENT:      Left Ear: Drainage, swelling and tenderness present.         Results:  No results found for any visits on 25.        Past Medical History: has been reviewed by me. I have also reviewed past visits, lab results and studies  Adverse Drug Reactions: Patient has no known allergies.    Medications: reviewed by me today    Family History: Reviewed by me today  Social History:   Social History     Tobacco Use    Smoking status: Former     Current packs/day: 0.00     Average packs/day: 1 pack/day for 30.3 years (30.3 ttl pk-yrs)     Types: Cigarettes     Start date: 1993     Quit date: 2023     Years since quittin.6     Passive exposure: Past    Smokeless tobacco: Never   Substance Use Topics    Alcohol use: Not Currently       Tobacco:   History   Smoking Status    Former    Types: Cigarettes   Smokeless Tobacco    Never         I have reviewed and recommended any over-the-counter medications that will aid in the symptomatic relief of this illness.    The risk of complications, morbidity, and/or mortality of patient management decisions were made during the visit with the patient. These may be associated with the patient s problems, the diagnostic procedures, or the treatment. This includes " possible management options selected, as well options considered but ultimately not selected, after shared medical decision making with the patient and/or family.        ICD-10-CM    1. Chronic bacterial otitis externa of left ear  H60.392 cephALEXin (KEFLEX) 500 MG capsule           Yousif Avery MD  1/9/2025, 3:31 PM.      Patient Instructions   Please have Mr. Negrete come back in 2-3 days for ear lavage     Treating for infection with antibiotics

## 2025-01-13 ENCOUNTER — OFFICE VISIT (OUTPATIENT)
Dept: URGENT CARE | Facility: URGENT CARE | Age: 45
End: 2025-01-13
Payer: COMMERCIAL

## 2025-01-13 VITALS
SYSTOLIC BLOOD PRESSURE: 135 MMHG | TEMPERATURE: 97.9 F | OXYGEN SATURATION: 98 % | BODY MASS INDEX: 46.65 KG/M2 | DIASTOLIC BLOOD PRESSURE: 84 MMHG | WEIGHT: 315 LBS | RESPIRATION RATE: 20 BRPM | HEIGHT: 69 IN | HEART RATE: 82 BPM

## 2025-01-13 DIAGNOSIS — J34.3 NASAL TURBINATE HYPERTROPHY: Primary | ICD-10-CM

## 2025-01-13 DIAGNOSIS — H69.92 DYSFUNCTION OF LEFT EUSTACHIAN TUBE: ICD-10-CM

## 2025-01-13 PROCEDURE — 99214 OFFICE O/P EST MOD 30 MIN: CPT | Performed by: PHYSICIAN ASSISTANT

## 2025-01-13 RX ORDER — PREDNISONE 20 MG/1
20 TABLET ORAL DAILY
Qty: 5 TABLET | Refills: 0 | Status: SHIPPED | OUTPATIENT
Start: 2025-01-13 | End: 2025-01-18

## 2025-01-13 RX ORDER — FLUTICASONE PROPIONATE 50 MCG
2 SPRAY, SUSPENSION (ML) NASAL DAILY
Qty: 18.2 ML | Refills: 1 | Status: SHIPPED | OUTPATIENT
Start: 2025-01-13

## 2025-01-13 NOTE — PROGRESS NOTES
Patient presents with:  Urgent Care  Ear Problem: Pt in clinic to have eval for left ear hearing loss.    (J34.3) Nasal turbinate hypertrophy  (primary encounter diagnosis)  Comment:   Plan: fluticasone (FLONASE) 50 MCG/ACT nasal spray        2 sprays each nostril at bedtime every night for 2 weeks minimum.      (H69.92) Dysfunction of left eustachian tube  Comment:   Plan: predniSONE (DELTASONE) 20 MG tablet daily for 5 days.              Finish antibiotic,  Follow up with primary clinic should symptoms persist or worsen.      At the end of the encounter, I discussed results, diagnosis, medications. Discussed red flags for immediate return to clinic/ER, as well as indications for follow up if no improvement. Patient understood and agreed to plan. Patient was stable for discharge         SUBJECTIVE:   Severiano Negrete is a 44 year old male who presents today with left ear pressure.  Was treated for an ear infection recently with antibiotics and ear drops.   He still feels like his left ear is blocked.      Complains of nasal congestion.          Patient Active Problem List   Diagnosis    Depression    Anxiety    Renal cell carcinoma, right (H)    Asymptomatic varicose veins of both lower extremities    Viral wart on left thumb    Substance use disorder    Dyslipidemia    Metabolic syndrome X    Hepatic steatosis    Class 3 severe obesity without serious comorbidity in adult (H)    History of kidney cancer         No past medical history on file.      Current Outpatient Medications   Medication Sig Dispense Refill    Multiple Vitamins-Iron (DAILY-LEIGH/IRON/BETA-CAROTENE) TABS TAKE 1 TABLET BY MOUTH DAILY. (Patient not taking: Reported on 10/19/2020) 30 tablet 7     Social History     Tobacco Use    Smoking status: Never Smoker    Smokeless tobacco: Never Used   Substance Use Topics    Alcohol use: Not on file     Family History   Problem Relation Age of Onset    Diabetes Mother     Diabetes Father          ROS:    10  "point ROS of systems including Constitutional, Eyes, Respiratory, Cardiovascular, Gastroenterology, Genitourinary, Integumentary, Muscularskeletal, Psychiatric ,neurological were all negative except for pertinent positives noted in my HPI       OBJECTIVE:  /84   Pulse 82   Temp 97.9  F (36.6  C) (Temporal)   Resp 20   Ht 1.753 m (5' 9\")   Wt 147.4 kg (325 lb)   SpO2 98%   BMI 47.99 kg/m    Physical Exam:  GENERAL APPEARANCE: healthy, alert and no distress  EYES: EOMI,  PERRL, conjunctiva clear  HENT: ear canals and TM's normal, albeit the left TM is retracted.  Nose with boggy turbinates, left turbinate is nearly occluding the nasal passage.  Mouth without ulcers, erythema or lesions  NECK: supple, nontender, no lymphadenopathy  NEURO: Normal strength and tone, sensory exam grossly normal,  normal speech and mentation      "

## 2025-01-13 NOTE — PATIENT INSTRUCTIONS
(J34.3) Nasal turbinate hypertrophy  (primary encounter diagnosis)  Comment:   Plan: fluticasone (FLONASE) 50 MCG/ACT nasal spray        2 sprays each nostril at bedtime every night for 2 weeks minimum.      (H69.92) Dysfunction of left eustachian tube  Comment:   Plan: predniSONE (DELTASONE) 20 MG tablet daily for 5 days.              Finish antibiotic,  Follow up with primary clinic should symptoms persist or worsen.

## 2025-01-19 ENCOUNTER — HEALTH MAINTENANCE LETTER (OUTPATIENT)
Age: 45
End: 2025-01-19

## 2025-02-26 ENCOUNTER — TELEPHONE (OUTPATIENT)
Dept: GASTROENTEROLOGY | Facility: CLINIC | Age: 45
End: 2025-02-26
Payer: COMMERCIAL

## 2025-02-26 ENCOUNTER — HOSPITAL ENCOUNTER (OUTPATIENT)
Facility: CLINIC | Age: 45
End: 2025-02-26
Attending: INTERNAL MEDICINE | Admitting: INTERNAL MEDICINE
Payer: COMMERCIAL

## 2025-02-26 NOTE — TELEPHONE ENCOUNTER
"Endoscopy Scheduling Screen    Have you had any respiratory illness or flu-like symptoms in the last 10 days?  No    What is your communication preference for Instructions and/or Bowel Prep?   MyChart    What insurance is in the chart?  Other:  [facility]    Ordering/Referring Provider: RYNE MCLAUGHLIN   (If ordering provider performs procedure, schedule with ordering provider unless otherwise instructed. )    BMI: Estimated body mass index is 47.99 kg/m  as calculated from the following:    Height as of 1/13/25: 1.753 m (5' 9\").    Weight as of 1/13/25: 147.4 kg (325 lb).     Sedation Ordered  moderate sedation.   If patient BMI > 50 do not schedule in [facility].    If patient BMI > 45 do not schedule at [facility].    Are you taking methadone or Suboxone?  NO, No RN review required.    Have you been diagnosed and are being treated for severe PTSD or severe anxiety?  YES, Schedule with [facility]. (If patient has additional questions please InBasket to RN pool for .)    Are you taking any prescription medications for pain 3 or more times per week?   NO, No RN review required.    Do you have a history of malignant hyperthermia?  No    (Females) Are you currently pregnant?   No     Have you been diagnosed or told you have pulmonary hypertension?   No    Do you have an LVAD?  No    Have you been told you have moderate to severe sleep apnea?  No.    Have you been told you have COPD, asthma, or any other lung disease?  No    Do you  have a history of any heart conditions or any upcoming cardiac exams like an echo, angiogram, stress test, or ablation?  No     Have you ever had or are you waiting for an organ transplant?  No. Continue scheduling, no site restrictions.    Have you had a stroke or transient ischemic attack (TIA aka \"mini stroke\") in the last 2 years?   No.    Have you been diagnosed with or been told you have cirrhosis of the liver?   No.    Are you currently on dialysis?   No    Do you need assistance " "transferring?   No    BMI: Estimated body mass index is 47.99 kg/m  as calculated from the following:    Height as of 1/13/25: 1.753 m (5' 9\").    Weight as of 1/13/25: 147.4 kg (325 lb).     Is patients BMI > 40 and scheduling location [facility]?  No    Do you take an injectable or oral medication for weight loss or diabetes (excluding insulin)?  Yes, hold time can be up to 7 days. Please consult with you prescribing provider to discuss endoscopy recommendations. (Please schedule at least 7 days out.)    Do you take the medication Naltrexone?  No    Do you take blood thinners?  No       Prep   Are you currently on dialysis or do you have chronic kidney disease?  No    Do you have a diagnosis of diabetes?  No    Do you have a diagnosis of cystic fibrosis (CF)?  No    On a regular basis do you go 3 -5 days between bowel movements?  No    BMI > 40?  Yes (Extended Prep)    Preferred Pharmacy:  [id number] - [facility] [id number] [facility] [[id number]]     Final Scheduling Details     Procedure scheduled  Colonoscopy    Surgeon:  AREN     Date of procedure:  4/29/25     Pre-OP / PAC:   Yes - Patient informed of pre-op requirement.    Location  [facility] - Patient preference.    Sedation   [facility]/Deep Sedation - Per exclusion criteria.      Patient Reminders:   You will receive a call from a Nurse to review instructions and health history.  This assessment must be completed prior to your procedure.  Failure to complete the Nurse assessment may result in the procedure being cancelled.      On the day of your procedure, please designate an adult(s) who can drive you home stay with you for the next 24 hours. The medicines used in the exam will make you sleepy. You will not be able to drive.      You cannot take public transportation, ride share services, or non-medical taxi service without a responsible caregiver.  Medical transport services are allowed with the requirement that a responsible caregiver will receive you at your " destination.  We require that drivers and caregivers are confirmed prior to your procedure.

## 2025-04-11 ENCOUNTER — TELEPHONE (OUTPATIENT)
Dept: GASTROENTEROLOGY | Facility: CLINIC | Age: 45
End: 2025-04-11
Payer: COMMERCIAL

## 2025-04-11 DIAGNOSIS — Z12.11 SPECIAL SCREENING FOR MALIGNANT NEOPLASMS, COLON: Primary | ICD-10-CM

## 2025-04-11 RX ORDER — BISACODYL 5 MG/1
TABLET, DELAYED RELEASE ORAL
Qty: 4 TABLET | Refills: 0 | Status: SHIPPED | OUTPATIENT
Start: 2025-04-11

## 2025-04-11 NOTE — LETTER
April 11, 2025      Severiano Negrete  [address] [place] [address]              Dear Severiano,    Colonoscopy     Procedure date: 4/29/25    Anticipated arrival time: 12:45 PM   (Please note that arrival times may change)    Facility location: [facility]; [address] [place] [address] - Check in location: 1st [facility]. Parking information: Self pay parking available in [employer] Parking Ramp. The Skyway Ramp is  located across [facility] from the hospital and is connected to the  hospital by a skyway. The skyway access is on Level C of the parking ramp.   parking is available Monday through Friday from 5:30 a.m.-5 p.m.  parking attendants are stationed at Door 2 at the [facility] entrance,  located off of [address]    Important Procedure Reminders:     A Pre-Operative Physical Exam is required within 30 days of your procedure date.     Please be sure to schedule and complete an in-person pre-operative physical exam with your primary care provider or clinic. If you completed this exam outside of the [facility] system, please have them fax the report to [facility] - [phone].     If you do not have a primary care provider or clinic, an in-person clinic appointment can be scheduled with our Pre-Operative Assessment Center (PAC) at [facility] located at [address] [place] [address]. Virtual appointments will not be accepted. To schedule an in-person visit, please call the Pre-Operative Assessment Center at [phone].       Prep Instructions:   Instructions on how to prepare for your upcoming procedure are found below. Please read instructions carefully. Deviation from instructions may result in less than desired outcomes and procedure may need to be rescheduled.   If you have additional questions regarding how to prepare for your upcoming procedure, contact our endoscopy pre assessment nurses at [phone] option 3  Monday through Friday 7:00am-5:00pm.      Policy:   The medications used during the procedure will make you sleepy, so you won't be able to drive. On the day of your procedure, please have an adult ready to drive you home and stay with you for the next 24 hours.   You can't use public transportation, ride-share services, or non-medical taxi services without a responsible caregiver. Medical transport services are okay, but a caregiver must be there to receive you at your destination.   Make sure your  and caregiver are confirmed before your procedure.    Day of procedure:  Please keep in mind that arrival times may change. Let your  know there might be a one-hour window for changes.  We ask that you please check in at the  with your . Your  should remain on campus.  Expect to be at the procedure center for about 1.5-2.5 hours.    Please do not wear jewelry (i.e. earrings, rings, necklaces, watches, etc). Leave your purse, billfold, credit cards, and other valuables at home.   Bring insurance card and ID.     To cancel or reschedule your procedure:   If you need to cancel or reschedule, our endoscopy scheduling team can be reached at [phone], option 1. Monday through Friday, 7:00am-5:00pm.    Medication Reminders:    Please note the following medication holding recommendations:   Metformin: HOLD day of procedure.    ---------------------------------------------------------------------------------------------------------------------------------------------------------------------------------------------------------------                      Golytely Extended Bowel Prep   Prep instructions for your colonoscopy     [facility]; [address] [place] [address] - Check in location: [address] [place] [address].   For prep questions, please call: [facility] - [phone] option 3    Please read these instructions carefully at least 7 days prior to your colonoscopy  procedure. Be sure to follow all directions completely. The inside of your colon must be clean to allow for a complete examination for the presence of any growths, polyps, and/or abnormalities, as well as their biopsy or removal. A number of tips are included in order to make this part of the procedure as comfortable as possible.    Getting ready   Bowel prep sent to [id number] - [place], [place] - [address] [facility].      4  Dulcolax (Bisacodyl) 5mg tablets  Two containers of Polyethylene glycol (Golytely, Colyte, Nulytely, Gavilyte-G)    A nurse will call you to go over your appointment details and prep instructions.     You must arrange for an adult to drive you home after your exam. Your colonoscopy cannot be done unless you have a ride. If you need to use public transportation, someone must ride with you and stay with you for up to 24 hours.       7 days before procedure     Medications that may need to be held before procedure:     GLP-1 agonist medication for diabetes or weight loss: such as Mounjaro (Tirzepatide).  Ozempic (Semaglutide). Rybelsus (Semaglutide), Tirzepatide-Weight Management (Zepbound), Wegovy (Semaglutide) or others, holding times may vary based on how you take this medication. This may be up to a 7 day hold. Our pre assessment nurses will call and discuss holding recommendations 1-2 weeks before scheduled procedure.     Blood thinning and/or anti platelet medications: such as Coumadin, Plavix, Xarelto, Eliquis, Lovenox or others, ask your your prescribing provider about holding recommendations.     If you take insulin for diabetes, ask your prescribing provider for instructions on how to manage this medication while preparing for a colonoscopy.     Stop taking iron (ferrous sulfate), multivitamins that contain iron, and/or fiber supplements (Metamucil, Benefiber, Psyllium husk powder, Fibercon, Bran, etc.) 7 days before procedure.     Stop eating whole kernel corn, popcorn,  nuts, and foods that contain seeds. These can stay in the colon for many days and they can clog up the colonoscope.     Begin a low-fiber diet. (See examples below). No Olestra (a fat substitute).   Consume no more than 10-15 grams of fiber each day.       LOW FIBER DIET   You may have: Do not have:    Starches: White bread, rolls, biscuits, croissants, Tiffanie toast, white flour tortillas, waffles, pancakes, Belarusian toast; white rice, noodles, pasta, macaroni; cooked and peeled potatoes; plain crackers, saltines; cooked farina or cream of rice; puffed rice, corn flakes, Rice Krispies, Special K      Vegetables: tender cooked and canned, vegetable broths     Fruits and fruit juices: Strained fruit juice, canned fruit without seeds or skin (not pineapple), applesauce, pear sauce, ripe bananas, melons (not watermelon)     Milk products: Milk (plain or flavored), cheese, cottage cheese, yogurt (no berries), custard, ice cream       Proteins: Tender, well-cooked ground beef, lamb, veal, ham, pork, chicken, turkey, fish or organ meat, Tofu, eggs, creamy peanut butter      Fats and condiments: Margarine, butter, oils, mayonnaise, sour cream, salad dressing, plain gravy; spices, cooked herbs; sugar, clear jelly, honey, syrup      Snacks, sweets and drinks: Pretzels, hard candy; plain cakes and cookies (no nuts or seeds); gelatin, plain pudding, sherbet, Popsicles; coffee, tea, carbonated ( fizzy ) drinks  Starches: Breads or rolls that contain nuts, seeds or fruit; whole wheat or whole grain breads that contain more than 2 grams of fiber per serving; cornbread; corn or whole wheat tortillas; potatoes with skin; brown rice, wild rice, quinoa, kasha (buckwheat), and oatmeal      Vegetables: Any raw or steamed vegetables; vegetables with seeds; corn in any form      Fruits and fruit juices: Prunes, prune juice, raisins and other dried fruits, berries and other fruits with seeds, canned pineapple juices with pulp such as orange,  grapefruit, pineapple or tomato juice     Milk products: Any yogurt with nuts, seeds or berries      Proteins: Tough, fibrous meats with gristle; cooked dried beans, peas or lentils; crunchy peanut butter     Fats and condiments: Pickles, olives, relish, horseradish; jam, marmalade, preserves      Snacks, sweets and drinks: Popcorn, nuts, seeds, granola, coconut, candies made with nuts or seeds; all desserts that contain nuts, seeds, raisins and other dried fruits, coconut, whole grains or bran.                                         2 days before procedure       You may have a light, low fiber breakfast and lunch. Begin a clear liquid diet AFTER 1 PM. Do not eat solid foods. (See examples below).    Drink at least eight to ten 8-ounce glasses of water throughout the day  ? ? ? ? ? ? ? ?    Fill one container of Golytely with a full gallon of warm water. Cover and shake until well mixed. Chill in refrigerator until it is time to drink solution.     CLEAR LIQUID DIET:  You can have: Do not have:    Water, tea, coffee (no milk or cream)   Soda pop, Gatorade (not red or purple)   Coconut water   Jell-O, Popsicles (no milk or fruit pieces - not red or purple)   Fat-free soup broth or bouillon   Plain hard candy, such as clear life savers (not red or purple)   Clear juices and fruit-flavored drinks, such as apple juice, white grape juice, Hi-C, and Tab-Aid (not red or purple)    Milk or milk products such as ice cream, malts or shakes, or coffee creamer   Red or purple drinks of any kind such as cranberry juice, grape juice, or Tab-Aid. Avoid red or purple Jell-O, Popsicles, sorbet, sherbet and candy.   Juices with pulp such as orange, grapefruit, pineapple, or tomato juice   Cream soups of any kind   Alcohol and beer   Protein drinks or protein powder     Step 1     At 4 PM, take 2 Dulcolax (Bisacodyl) tablets.  At 5 PM, start drinking one 8-ounce glass of Golytely mixture every 15 minutes until the container is HALF  empty. Drink each glass quickly. Store the rest in the refrigerator.   Continue to drink clear liquids.      Reminders While Drinking Laxatives:     After you start drinking the solution, stay near a toilet. You may have watery stools (diarrhea), mild cramping, bloating, and nausea. You may want to use Vaseline on the skin around your anus after each bowel movement or use wet wipes to prevent irritation. Bowel movements will be liquid and dark in color at first and then should turn clear yellow in color. Drinking the prepared solution may make you cold, wearing warm clothing can be helpful.    Some find it helpful to:  For added flavor, include a crystal light lemonade packet in each glass of Golytely, rather than mixing it into the entire prepared mixture.  In between each glass, try sucking on a lemon or a piece of hard candy to help diminish the flavor of the preparation.  Drinking from a straw can help minimize the taste of the prepared mixture.    If you have nausea or vomiting during drinking the solution, rinse your mouth with water and take a 15-30 minute break and then continue drinking solution.       1 day before procedure       Continue on a clear liquid diet. Do not eat solid foods.    Drink at least eight to ten 8-ounce glasses of water throughout the day  ? ? ? ? ? ? ? ?    Step 2     At 4 PM, take 2 Dulcolax (Bisacodyl) tablets.  At 5 PM, start drinking the 2nd half of Golytely mixture. Drink one 8-ounce glass of Golytely mixture every 15 minutes until the container is empty.  Drink each glass quickly.   Continue to drink clear liquids.    Before you go to bed mix the second container of Golytely and place in the refrigerator for the morning.     Day of procedure     Step 3     6 hours before your arrival time, start drinking one 8-ounce glass of Golytely mixture every 15 minutes until the container is HALF empty. You will not drink the entire container. The remaining solution can be discarded.     2  hours before your arrival time stop drinking all liquids, including water.   Do not smoke or swallow anything, including water or gum for at least 2 hours before your arrival time. This is a safety issue. Your procedure could be cancelled if you do not follow directions.  No chewing tobacco 6 hours prior to procedure arrival time.     You may take your necessary morning medications with sips of water (4 ounces).   Do not take diabetes medicine by mouth until after your exam.  If you have asthma, bring your inhalers.  Please perform your nebulizer treatments and airway clearance therapy in the morning prior to the procedure (if applicable).    Arrive with a responsible adult who can drive you home and stay with you for a minimum of 24 hours. The medications used during the procedure will make you sleepy, so you won't be able to drive yourself home.   You cannot use public transportation, ride-share services, or non-medical taxi services without a responsible caregiver. Medical transport services are okay, but a caregiver must be there to receive you at your destination.  Please check in with your  when you arrive. Drivers should stay on campus.    Expect to be at the procedure center for about 1.5-2.5 hours.    Do not wear jewelry (i.e. earrings, rings, necklaces, watches, etc.). Leave your purse, billfold, credit cards, and other valuables at home.      Bring insurance card and ID.       Answers to Commonly Asked Questions     How soon can I eat after the procedure?  You may resume your normal diet when you feel ready, unless advised otherwise by the doctor performing your procedure. We recommend starting with a light meal.   Do not drink alcohol for 24 hours after your procedure.  You may resume normal activities (work, exercise, etc.) after 24 hours.    How will I feel after the procedure?  It is normal to feel bloated and gassy after your procedure. Walking will help move the air through your colon. You can  take non-aspirin pain relievers that contain acetaminophen (Tylenol).  If you are having sedation, we require a responsible adult to take you home for your safety. The sedation medicines used to relax you during the procedure can impair your judgement and reaction time and make you forgetful and possibly a little unsteady.  Do not drive, make any important decisions, or sign any legal documents for 24 hours after your procedure.    When will I get my test results?  You should have your procedure results and any lab results (if applicable) by letter, [employer] message, or phone call within 2 weeks. If you have any questions, please call the doctor that referred you for the procedure.    How do I know if my colon is cleaned out?   After completing the bowel prep, your bowel movements should be all liquid and yellow. Your bowel movements will look similar to urine in the toilet. If there are pieces of stool (poop) in the toilet, or if you can't see to the bottom of the toilet, please call our office for advice. Call [phone] and ask to speak with a nurse.    Why is the Golytely bowel prep taken in several steps?   The stool is flushed out by a large wave of fluid going through the colon. Just sipping a large volume of the solution will not achieve the desired result. Studies have shown that two smaller waves (or more in some cases) are better than one large one.      What if I need to cancel or reschedule my procedure?  Contact our endoscopy scheduling team at [phone], option 1. Monday through Friday, 7:00am-5:00pm.

## 2025-04-11 NOTE — TELEPHONE ENCOUNTER
Pre visit planning completed.      Procedure details:    Patient scheduled for Colonoscopy on 4/29/25.     Arrival time: 1245. Procedure time 1345    Facility location: [facility]; [address] [place] [address]. Check in location: [facility].     Sedation type: MAC - hx anxiety, ETOH abuse (despite sober living group home- see 3/16/25 ED visit in outside records)    Pre op exam needed? Yes. Patient needs to complete an in person pre-operative exam within 30 days of procedure. Informed patient pre op is needed via mAPPnt.    Indication for procedure: screening      Chart review:     Electronic implanted devices? No    Recent diagnosis of diverticulitis within the last 6 weeks? No      Medication review:    Diabetic? No    Anticoagulants? No    Weight loss medication/injectable? Yes. Metformin: HOLD day of procedure.    Other medication HOLDING recommendations:  N/A      Prep for procedure:     Bowel prep recommendation: Extended Golytely. Bowel prep sent to    [id number] - [place], [place] - [address] [facility].  Due to: BMI > 40    Procedure information and instructions sent via [employer] and letter        Rosi Mace RN  Endoscopy Procedure Pre Assessment   [phone] option 3

## 2025-04-14 NOTE — TELEPHONE ENCOUNTER
Attempted to contact patient in order to complete pre assessment questions.     No answer. Left message to return call to [phone] option 3.    Callback communication sent via [employer].    Rosi Mace RN

## 2025-04-22 ENCOUNTER — TELEPHONE (OUTPATIENT)
Dept: GASTROENTEROLOGY | Facility: CLINIC | Age: 45
End: 2025-04-22
Payer: COMMERCIAL

## 2025-04-22 NOTE — TELEPHONE ENCOUNTER
Writer was able to reach patient to schedule PAC visit as patient did not want to cancel. Warm transferred to pre-assessment RN Corinne.

## 2025-04-22 NOTE — TELEPHONE ENCOUNTER
----- Message from Leida JONES sent at 4/22/2025  8:05 AM CDT -----  Regarding: Cancel please  Pre assessment and Pre-op not were not completed for upcoming Colonoscopy scheduled on 4-29-25.    Please cancel per policy.       Thank you,   Leida Méndez RN  Endoscopy Procedure Pre Assessment   [phone] option 3

## 2025-04-24 NOTE — TELEPHONE ENCOUNTER
FUTURE VISIT INFORMATION      SURGERY INFORMATION:  Date: 4/29/25  Location:  GI  Surgeon:  Ben Thomson MD   Anesthesia Type:  MAC  Procedure: Colonoscopy   Consult: 2/26/25    RECORDS REQUESTED FROM:       Primary Care Provider: Jair Dietrich MD - [facility]    Pertinent Medical History: Screen for colon cancer; Hepatic steatosis; Asymptomatic varicose veins of both lower extremities; Substance use disorder; Renal cell carcinoma, right (H);     Most recent EKG+ Tracing: 10/16/24    Most recent Coronary Angiogram: 2/14/24

## 2025-04-25 ENCOUNTER — PRE VISIT (OUTPATIENT)
Dept: SURGERY | Facility: CLINIC | Age: 45
End: 2025-04-25

## 2025-04-29 ENCOUNTER — ANESTHESIA EVENT (OUTPATIENT)
Dept: GASTROENTEROLOGY | Facility: CLINIC | Age: 45
End: 2025-04-29
Payer: COMMERCIAL

## 2025-04-29 ENCOUNTER — ANESTHESIA (OUTPATIENT)
Dept: GASTROENTEROLOGY | Facility: CLINIC | Age: 45
End: 2025-04-29
Payer: COMMERCIAL

## 2025-04-29 NOTE — ANESTHESIA PREPROCEDURE EVALUATION
"Anesthesia Pre-Procedure Evaluation    Patient: Severiano Negrete   MRN: [id number] : 1980        Procedure : Procedure(s):  Colonoscopy          No past medical history on file.   No past surgical history on file.   No Known Allergies   Social History     Tobacco Use     Smoking status: Former     Current packs/day: 0.00     Average packs/day: 1 pack/day for 30.3 years (30.3 ttl pk-yrs)     Types: Cigarettes     Start date: 1993     Quit date: 2023     Years since quittin.9     Passive exposure: Past     Smokeless tobacco: Never   Substance Use Topics     Alcohol use: Not Currently      Wt Readings from Last 1 Encounters:   25 147.4 kg (325 lb)        Anesthesia Evaluation            ROS/MED HX  ENT/Pulmonary:  - neg pulmonary ROS  (-) sleep apnea   Neurologic:  - neg neurologic ROS     Cardiovascular:  - neg cardiovascular ROS     METS/Exercise Tolerance:     Hematologic:       Musculoskeletal:       GI/Hepatic:     (+)             liver disease,    (-) GERD   Renal/Genitourinary: Comment: H/o RCC s/p nephrectomy    (+) renal disease,             Endo:     (+)               Obesity,       Psychiatric/Substance Use:     (+) psychiatric history anxiety and depression alcohol abuse      Infectious Disease:       Malignancy:       Other:             OUTSIDE LABS:  CBC: No results found for: \"WBC\", \"HGB\", \"HCT\", \"PLT\"  BMP:   Lab Results   Component Value Date     2023    POTASSIUM 5.0 2023    CHLORIDE 102 2023    CO2 28 2023    BUN 22.1 (H) 2023    CR 1.15 2023    GLC 89 2023     COAGS: No results found for: \"PTT\", \"INR\", \"FIBR\"  POC: No results found for: \"BGM\", \"HCG\", \"HCGS\"  HEPATIC:   Lab Results   Component Value Date    ALBUMIN 4.4 2023    PROTTOTAL 7.5 2023    ALT 8 2023    AST 15 2023    ALKPHOS 87 2023    BILITOTAL 0.3 2023     OTHER:   Lab Results   Component Value Date    A1C 5.3 2024    RADHA 9.5 " 07/13/2023       Anesthesia Plan    ASA Status:  3       Anesthesia Type: MAC.     - Reason for MAC: straight local not clinically adequate              Consents            Postoperative Care       PONV prophylaxis: Ondansetron (or other 5HT-3)     Comments:             Rito Roman, , DO    Clinically Significant Risk Factors Present on Admission

## 2025-05-24 ENCOUNTER — HOSPITAL ENCOUNTER (EMERGENCY)
Facility: CLINIC | Age: 45
Discharge: HOME | End: 2025-05-24
Attending: STUDENT IN AN ORGANIZED HEALTH CARE EDUCATION/TRAINING PROGRAM | Admitting: STUDENT IN AN ORGANIZED HEALTH CARE EDUCATION/TRAINING PROGRAM
Payer: MEDICAID

## 2025-05-24 VITALS
HEART RATE: 80 BPM | BODY MASS INDEX: 45.1 KG/M2 | HEIGHT: 70 IN | TEMPERATURE: 98.5 F | WEIGHT: 315 LBS | SYSTOLIC BLOOD PRESSURE: 137 MMHG | RESPIRATION RATE: 18 BRPM | OXYGEN SATURATION: 94 % | DIASTOLIC BLOOD PRESSURE: 71 MMHG

## 2025-05-24 DIAGNOSIS — F10.929 ALCOHOLIC INTOXICATION WITH COMPLICATION: Primary | ICD-10-CM

## 2025-05-24 DIAGNOSIS — F12.90 MARIJUANA USE: ICD-10-CM

## 2025-05-24 PROCEDURE — 99285 EMERGENCY DEPT VISIT HI MDM: CPT

## 2025-05-24 ASSESSMENT — ACTIVITIES OF DAILY LIVING (ADL): ADLS_ACUITY_SCORE: 41

## 2025-05-24 NOTE — ED NOTES
Bed: ED19  Expected date:   Expected time:   Means of arrival:   Comments:   44 M intoxication on hold eta [id number]

## 2025-05-24 NOTE — ED NOTES
"Pt kept walking to the room door leading to the nurses' station, saying, \"I want to go home!\" Pt's gait appears more steady than earlier, and pt is constantly getting out of bed and walking around the room. Pt appears to be ambulating safely. MD at bedside to evaluate patient. Patient does not have his phone on him. He does not want to use the hospital phone to call a taxi, so patient agrees to take the bus. Given a bus token. Pt given all of his personal belongings, and hospital security escorting patient to bus stop.  "

## 2025-05-24 NOTE — ED TRIAGE NOTES
Pt BIBA from [facility] on a police hold, intoxicated and high on weed. PD found pt with a small open container of vodka that was half-empty. Pt said he had been drinking all day since noon. Pt unsteady on his feet, slurring speech. Pt semi-cooperative. VSS.

## 2025-05-24 NOTE — ED NOTES
"Patient unsteady on his feet, assisted with using urinal. Pt initially upset, asking to leave. This Writer explained to patient he arrived on a police hold, and will be able to discharge once he is sober. This Writer gave patient warm blankets per his request, as well as a hospital courtesy meal and water. Pt oriented to call light use, and pt is resting in bed watching TV. Pt initially upset and hostile, now is calm and cooperative, asking how long he will be here. \"Will I be here for days? Months? Because now I don't want to leave,\" he says, thanking staff.  "

## 2025-05-24 NOTE — ED PROVIDER NOTES
"  Emergency Department Note      History of Present Illness     Chief Complaint   Alcohol Intoxication      HPI   Severiano Negrete is a pleasant 44 year old male presenting with alcohol intoxication.  Patient arrives via EMS from the [facility].  He is on a police hold.  Reportedly a welfare check was called on the patient due to him seeming intoxicated, unable to walk steadily, unable to take care of himself.  He had a small container of vodka that was open and half empty.  Patient states he drank about a pint of vodka today and was also using weed.  He is drinking for about the last 6 hours.  Denies any mental health concerns.  Denies any falls or injuries.  No other medical concerns at this time.    Independent Historian   None    Review of External Notes   I personally reviewed notes from the patient's outside emergency department visit  dated 3/17/2025. This provided me with information regarding history of alcohol intoxication.     Past Medical History     Medical History and Problem List   Depression   Anxiety   Renal cell carcinoma, right (H)   Asymptomatic varicose veins of both lower extremities   Viral wart on left thumb   Substance use disorder   Dyslipidemia   Metabolic syndrome X   Hepatic steatosis   Class 3 severe obesity without serious comorbidity in adult (H)   History of kidney cancer       Medications   amLODIPine (NORVASC) 10 MG tablet  bisacodyl (DULCOLAX) 5 MG EC tablet  FLUoxetine (PROZAC) 20 MG capsule  fluticasone (FLONASE) 50 MCG/ACT nasal spray  metFORMIN (GLUCOPHAGE XR) 500 MG 24 hr tablet  polyethylene glycol (GOLYTELY) 236 g suspension        Surgical History   No past surgical history on file.    Physical Exam     Patient Vitals for the past 24 hrs:   BP Temp Temp src Pulse Resp SpO2 Height Weight   05/24/25 1750 137/71 98.5  F (36.9  C) Oral 80 18 94 % 1.778 m (5' 10\") (!) 142.9 kg (315 lb)     Physical Exam  Vitals and nursing note reviewed.   Constitutional:       Appearance: He " is obese. He is not ill-appearing, toxic-appearing or diaphoretic.      Comments: Intoxicated appearing   HENT:      Head: Atraumatic.      Mouth/Throat:      Mouth: Mucous membranes are moist.   Eyes:      General: No scleral icterus.     Conjunctiva/sclera: Conjunctivae normal.      Comments: Disconjugate gaze   Cardiovascular:      Rate and Rhythm: Normal rate.   Pulmonary:      Effort: Pulmonary effort is normal.   Abdominal:      General: Abdomen is protuberant.   Musculoskeletal:         General: No deformity or signs of injury.      Cervical back: Neck supple.   Skin:     General: Skin is warm and dry.      Coloration: Skin is not jaundiced or pale.   Neurological:      Mental Status: He is alert and oriented to person, place, and time.      Gait: Gait abnormal.      Comments: Slurred speech           Diagnostics     Lab Results   Labs Ordered and Resulted from Time of ED Arrival to Time of ED Departure - No data to display    Imaging   No orders to display       EKG   No ECG performed.     Independent Interpretation   None    ED Course      Medications Administered   Medications - No data to display    Procedures   Procedures   None performed    Discussion of Management   None    ED Course        Additional Documentation  None    Medical Decision Making / Diagnosis     CMS Diagnoses: None    MIPS       None    MDM   Patient here with altered mental status. Admits to drinking alcohol and using marijuana.  Denies any other ingestions.  Denies any trauma or any other complaints.  Exam is unremarkable without any evidence of trauma or other acute pathology. Neuro exam is normal other than intoxication.  At this time I have no reason so suspect the altered mental status is due to anything other than alcohol intoxication.  The patient was initially placed on a GARCÍA by our nursing team because he arrived on a police hold.  After about an hour of observation, the patient is repeatedly getting up out of bed and coming  to the door of his room asking to leave.  He still does appear intoxicated but is ambulating with steady gait at this time and does not appear at risk of falling.  I do not feel the patient is holdable at this time and I do not feel that further workup is indicated in the emergency department currently.  I did strongly recommend to the patient that he stay in the emergency department to allow time to become more sober.  We offered to get the patient food and drink, and recommended he watch television.  However, the patient was persistently insisting he wished to leave at this time.  Patient did not have any questions about his management.     Disposition   The patient was discharged.     Diagnosis     ICD-10-CM    1. Alcoholic intoxication with complication  F10.929       2. Marijuana use  F12.90            Discharge Medications   Discharge Medication List as of 5/24/2025  6:39 PM           Juan Herrera MD  05/24/25 0737

## 2025-05-24 NOTE — ED NOTES
"Pt is wearing his own clothing. Patient's belongings placed in plastic bag and secured in room locker. Patient has been wanded by security. This Writer informed he is on GARCÍA and pt informed that continuous video monitoring is in place. MD at bedside to see patient. Patient says, \"I am steady!\" [On my feet] and keeps asking for food.  "